# Patient Record
Sex: FEMALE | Race: WHITE | Employment: OTHER | URBAN - NONMETROPOLITAN AREA
[De-identification: names, ages, dates, MRNs, and addresses within clinical notes are randomized per-mention and may not be internally consistent; named-entity substitution may affect disease eponyms.]

---

## 2017-03-16 PROBLEM — I87.2 CHRONIC VENOUS INSUFFICIENCY: Status: ACTIVE | Noted: 2017-03-16

## 2017-03-16 PROBLEM — I73.9 PAD (PERIPHERAL ARTERY DISEASE) (HCC): Status: ACTIVE | Noted: 2017-03-16

## 2017-08-04 ENCOUNTER — HOSPITAL ENCOUNTER (EMERGENCY)
Age: 77
Discharge: HOME | End: 2017-08-04
Payer: COMMERCIAL

## 2017-08-04 VITALS
HEART RATE: 100 BPM | BODY MASS INDEX: 20.12 KG/M2 | TEMPERATURE: 98.2 F | WEIGHT: 106.6 LBS | RESPIRATION RATE: 18 BRPM | DIASTOLIC BLOOD PRESSURE: 64 MMHG | HEIGHT: 61 IN | SYSTOLIC BLOOD PRESSURE: 135 MMHG | OXYGEN SATURATION: 94 %

## 2017-08-04 DIAGNOSIS — L08.9 INFECTED SKIN TEAR: Primary | ICD-10-CM

## 2017-08-04 DIAGNOSIS — I87.2 CHRONIC VENOUS INSUFFICIENCY: ICD-10-CM

## 2017-08-04 DIAGNOSIS — T14.8XXA INFECTED SKIN TEAR: Primary | ICD-10-CM

## 2017-08-04 DIAGNOSIS — L03.116 CELLULITIS OF LEFT LOWER LEG: ICD-10-CM

## 2017-08-04 PROCEDURE — A6446 CONFORM BAND S W>=3" <5"/YD: HCPCS

## 2017-08-04 PROCEDURE — 6370000000 HC RX 637 (ALT 250 FOR IP)

## 2017-08-04 PROCEDURE — 90471 IMMUNIZATION ADMIN: CPT | Performed by: NURSE PRACTITIONER

## 2017-08-04 PROCEDURE — 90715 TDAP VACCINE 7 YRS/> IM: CPT | Performed by: NURSE PRACTITIONER

## 2017-08-04 PROCEDURE — 99213 OFFICE O/P EST LOW 20 MIN: CPT | Performed by: NURSE PRACTITIONER

## 2017-08-04 PROCEDURE — A6445 CONFORM BAND S W <3"/YD: HCPCS

## 2017-08-04 PROCEDURE — 6360000002 HC RX W HCPCS: Performed by: NURSE PRACTITIONER

## 2017-08-04 PROCEDURE — 99213 OFFICE O/P EST LOW 20 MIN: CPT

## 2017-08-04 RX ORDER — CEPHALEXIN 500 MG/1
500 CAPSULE ORAL 3 TIMES DAILY
Qty: 30 CAPSULE | Refills: 0 | Status: SHIPPED | OUTPATIENT
Start: 2017-08-04 | End: 2017-08-14

## 2017-08-04 RX ORDER — GINSENG 100 MG
CAPSULE ORAL ONCE
Status: COMPLETED | OUTPATIENT
Start: 2017-08-04 | End: 2017-08-04

## 2017-08-04 RX ORDER — DIAPER,BRIEF,INFANT-TODD,DISP
EACH MISCELLANEOUS
Status: COMPLETED
Start: 2017-08-04 | End: 2017-08-04

## 2017-08-04 RX ADMIN — BACITRACIN ZINC: 500 OINTMENT TOPICAL at 19:25

## 2017-08-04 RX ADMIN — TETANUS TOXOID, REDUCED DIPHTHERIA TOXOID AND ACELLULAR PERTUSSIS VACCINE, ADSORBED 0.5 ML: 5; 2.5; 8; 8; 2.5 SUSPENSION INTRAMUSCULAR at 19:15

## 2017-08-04 RX ADMIN — Medication: at 19:25

## 2017-08-04 ASSESSMENT — PAIN SCALES - GENERAL: PAINLEVEL_OUTOF10: 5

## 2017-08-04 ASSESSMENT — ENCOUNTER SYMPTOMS
VOMITING: 0
SHORTNESS OF BREATH: 1
NAUSEA: 0

## 2017-08-04 ASSESSMENT — PAIN DESCRIPTION - ORIENTATION: ORIENTATION: LEFT

## 2017-08-04 ASSESSMENT — PAIN DESCRIPTION - LOCATION: LOCATION: LEG

## 2019-02-11 ENCOUNTER — APPOINTMENT (OUTPATIENT)
Dept: CT IMAGING | Age: 79
DRG: 309 | End: 2019-02-11
Payer: MEDICARE

## 2019-02-11 ENCOUNTER — HOSPITAL ENCOUNTER (INPATIENT)
Age: 79
LOS: 4 days | Discharge: NURSING, REHAB OR LONG-TERM CARE | DRG: 309 | End: 2019-02-15
Attending: INTERNAL MEDICINE | Admitting: INTERNAL MEDICINE
Payer: MEDICARE

## 2019-02-11 ENCOUNTER — APPOINTMENT (OUTPATIENT)
Dept: INTERVENTIONAL RADIOLOGY/VASCULAR | Age: 79
DRG: 309 | End: 2019-02-11
Payer: MEDICARE

## 2019-02-11 ENCOUNTER — APPOINTMENT (OUTPATIENT)
Dept: GENERAL RADIOLOGY | Age: 79
DRG: 309 | End: 2019-02-11
Payer: MEDICARE

## 2019-02-11 DIAGNOSIS — Z47.1 AFTERCARE FOLLOWING LEFT HIP JOINT REPLACEMENT SURGERY: ICD-10-CM

## 2019-02-11 DIAGNOSIS — I48.91 ATRIAL FIBRILLATION WITH RVR (HCC): Primary | ICD-10-CM

## 2019-02-11 DIAGNOSIS — Z96.642 AFTERCARE FOLLOWING LEFT HIP JOINT REPLACEMENT SURGERY: ICD-10-CM

## 2019-02-11 LAB
ALBUMIN SERPL-MCNC: 3.1 G/DL (ref 3.5–5.1)
ALP BLD-CCNC: 84 U/L (ref 38–126)
ALT SERPL-CCNC: 12 U/L (ref 11–66)
ANION GAP SERPL CALCULATED.3IONS-SCNC: 9 MEQ/L (ref 8–16)
AST SERPL-CCNC: 32 U/L (ref 5–40)
BASOPHILS # BLD: 0.4 %
BASOPHILS ABSOLUTE: 0 THOU/MM3 (ref 0–0.1)
BILIRUB SERPL-MCNC: 0.7 MG/DL (ref 0.3–1.2)
BILIRUBIN DIRECT: < 0.2 MG/DL (ref 0–0.3)
BILIRUBIN URINE: NEGATIVE
BLOOD, URINE: NEGATIVE
BUN BLDV-MCNC: 21 MG/DL (ref 7–22)
CALCIUM SERPL-MCNC: 9.6 MG/DL (ref 8.5–10.5)
CHARACTER, URINE: CLEAR
CHLORIDE BLD-SCNC: 101 MEQ/L (ref 98–111)
CO2: 32 MEQ/L (ref 23–33)
COLOR: YELLOW
CREAT SERPL-MCNC: 0.8 MG/DL (ref 0.4–1.2)
D-DIMER QUANTITATIVE: ABNORMAL NG/ML FEU (ref 0–500)
EKG ATRIAL RATE: 288 BPM
EKG Q-T INTERVAL: 264 MS
EKG QRS DURATION: 84 MS
EKG QTC CALCULATION (BAZETT): 428 MS
EKG R AXIS: 50 DEGREES
EKG T AXIS: 32 DEGREES
EKG VENTRICULAR RATE: 158 BPM
EOSINOPHIL # BLD: 3.1 %
EOSINOPHILS ABSOLUTE: 0.3 THOU/MM3 (ref 0–0.4)
ERYTHROCYTE [DISTWIDTH] IN BLOOD BY AUTOMATED COUNT: 17.8 % (ref 11.5–14.5)
ERYTHROCYTE [DISTWIDTH] IN BLOOD BY AUTOMATED COUNT: 58.5 FL (ref 35–45)
GFR SERPL CREATININE-BSD FRML MDRD: 69 ML/MIN/1.73M2
GLUCOSE BLD-MCNC: 131 MG/DL (ref 70–108)
GLUCOSE URINE: NEGATIVE MG/DL
HCT VFR BLD CALC: 33.5 % (ref 37–47)
HEMOGLOBIN: 10.5 GM/DL (ref 12–16)
IMMATURE GRANS (ABS): 0.07 THOU/MM3 (ref 0–0.07)
IMMATURE GRANULOCYTES: 0.8 %
INR BLD: 1.05 (ref 0.85–1.13)
KETONES, URINE: NEGATIVE
LEUKOCYTE ESTERASE, URINE: NEGATIVE
LIPASE: 43.2 U/L (ref 5.6–51.3)
LYMPHOCYTES # BLD: 9.7 %
LYMPHOCYTES ABSOLUTE: 0.9 THOU/MM3 (ref 1–4.8)
MCH RBC QN AUTO: 29.8 PG (ref 26–33)
MCHC RBC AUTO-ENTMCNC: 31.3 GM/DL (ref 32.2–35.5)
MCV RBC AUTO: 95.2 FL (ref 81–99)
MONOCYTES # BLD: 9.3 %
MONOCYTES ABSOLUTE: 0.8 THOU/MM3 (ref 0.4–1.3)
NITRITE, URINE: NEGATIVE
NUCLEATED RED BLOOD CELLS: 0 /100 WBC
OSMOLALITY CALCULATION: 287.9 MOSMOL/KG (ref 275–300)
PH UA: 5.5
PLATELET # BLD: 360 THOU/MM3 (ref 130–400)
PMV BLD AUTO: 9.7 FL (ref 9.4–12.4)
POTASSIUM SERPL-SCNC: 5.5 MEQ/L (ref 3.5–5.2)
PRO-BNP: ABNORMAL PG/ML (ref 0–1800)
PROTEIN UA: NEGATIVE
RBC # BLD: 3.52 MILL/MM3 (ref 4.2–5.4)
SEG NEUTROPHILS: 76.7 %
SEGMENTED NEUTROPHILS ABSOLUTE COUNT: 6.9 THOU/MM3 (ref 1.8–7.7)
SODIUM BLD-SCNC: 142 MEQ/L (ref 135–145)
SPECIFIC GRAVITY, URINE: 1.02 (ref 1–1.03)
T4 FREE: 1.15 NG/DL (ref 0.93–1.76)
TOTAL PROTEIN: 5.9 G/DL (ref 6.1–8)
TROPONIN T: < 0.01 NG/ML
TSH SERPL DL<=0.05 MIU/L-ACNC: 5.47 UIU/ML (ref 0.4–4.2)
UROBILINOGEN, URINE: 0.2 EU/DL
WBC # BLD: 9 THOU/MM3 (ref 4.8–10.8)

## 2019-02-11 PROCEDURE — 2709999900 HC NON-CHARGEABLE SUPPLY

## 2019-02-11 PROCEDURE — 80076 HEPATIC FUNCTION PANEL: CPT

## 2019-02-11 PROCEDURE — 84443 ASSAY THYROID STIM HORMONE: CPT

## 2019-02-11 PROCEDURE — 6370000000 HC RX 637 (ALT 250 FOR IP): Performed by: INTERNAL MEDICINE

## 2019-02-11 PROCEDURE — 96365 THER/PROPH/DIAG IV INF INIT: CPT

## 2019-02-11 PROCEDURE — 6360000004 HC RX CONTRAST MEDICATION: Performed by: INTERNAL MEDICINE

## 2019-02-11 PROCEDURE — 96376 TX/PRO/DX INJ SAME DRUG ADON: CPT

## 2019-02-11 PROCEDURE — 71275 CT ANGIOGRAPHY CHEST: CPT

## 2019-02-11 PROCEDURE — 99285 EMERGENCY DEPT VISIT HI MDM: CPT

## 2019-02-11 PROCEDURE — 84484 ASSAY OF TROPONIN QUANT: CPT

## 2019-02-11 PROCEDURE — 85025 COMPLETE CBC W/AUTO DIFF WBC: CPT

## 2019-02-11 PROCEDURE — 2580000003 HC RX 258: Performed by: INTERNAL MEDICINE

## 2019-02-11 PROCEDURE — 85379 FIBRIN DEGRADATION QUANT: CPT

## 2019-02-11 PROCEDURE — 2140000000 HC CCU INTERMEDIATE R&B

## 2019-02-11 PROCEDURE — 71045 X-RAY EXAM CHEST 1 VIEW: CPT

## 2019-02-11 PROCEDURE — 93005 ELECTROCARDIOGRAM TRACING: CPT | Performed by: INTERNAL MEDICINE

## 2019-02-11 PROCEDURE — 2500000003 HC RX 250 WO HCPCS: Performed by: INTERNAL MEDICINE

## 2019-02-11 PROCEDURE — 96366 THER/PROPH/DIAG IV INF ADDON: CPT

## 2019-02-11 PROCEDURE — 36415 COLL VENOUS BLD VENIPUNCTURE: CPT

## 2019-02-11 PROCEDURE — 84439 ASSAY OF FREE THYROXINE: CPT

## 2019-02-11 PROCEDURE — 93971 EXTREMITY STUDY: CPT

## 2019-02-11 PROCEDURE — 83690 ASSAY OF LIPASE: CPT

## 2019-02-11 PROCEDURE — 96375 TX/PRO/DX INJ NEW DRUG ADDON: CPT

## 2019-02-11 PROCEDURE — 6360000002 HC RX W HCPCS: Performed by: INTERNAL MEDICINE

## 2019-02-11 PROCEDURE — 83880 ASSAY OF NATRIURETIC PEPTIDE: CPT

## 2019-02-11 PROCEDURE — 85610 PROTHROMBIN TIME: CPT

## 2019-02-11 PROCEDURE — 80048 BASIC METABOLIC PNL TOTAL CA: CPT

## 2019-02-11 PROCEDURE — 81003 URINALYSIS AUTO W/O SCOPE: CPT

## 2019-02-11 RX ORDER — DULOXETIN HYDROCHLORIDE 30 MG/1
30 CAPSULE, DELAYED RELEASE ORAL DAILY
Status: DISCONTINUED | OUTPATIENT
Start: 2019-02-12 | End: 2019-02-15 | Stop reason: HOSPADM

## 2019-02-11 RX ORDER — DOCUSATE SODIUM 100 MG/1
100 CAPSULE, LIQUID FILLED ORAL 2 TIMES DAILY
Status: DISCONTINUED | OUTPATIENT
Start: 2019-02-11 | End: 2019-02-15 | Stop reason: HOSPADM

## 2019-02-11 RX ORDER — ALBUTEROL SULFATE 2.5 MG/3ML
0.63 SOLUTION RESPIRATORY (INHALATION) 4 TIMES DAILY
Status: DISCONTINUED | OUTPATIENT
Start: 2019-02-11 | End: 2019-02-12

## 2019-02-11 RX ORDER — ONDANSETRON 2 MG/ML
4 INJECTION INTRAMUSCULAR; INTRAVENOUS ONCE
Status: COMPLETED | OUTPATIENT
Start: 2019-02-11 | End: 2019-02-11

## 2019-02-11 RX ORDER — DULOXETIN HYDROCHLORIDE 30 MG/1
30 CAPSULE, DELAYED RELEASE ORAL DAILY
COMMUNITY

## 2019-02-11 RX ORDER — CLONAZEPAM 1 MG/1
2 TABLET ORAL NIGHTLY PRN
Status: DISCONTINUED | OUTPATIENT
Start: 2019-02-11 | End: 2019-02-15 | Stop reason: HOSPADM

## 2019-02-11 RX ORDER — FENTANYL CITRATE 50 UG/ML
25 INJECTION, SOLUTION INTRAMUSCULAR; INTRAVENOUS ONCE
Status: COMPLETED | OUTPATIENT
Start: 2019-02-11 | End: 2019-02-11

## 2019-02-11 RX ORDER — DILTIAZEM HYDROCHLORIDE 5 MG/ML
10 INJECTION INTRAVENOUS ONCE
Status: COMPLETED | OUTPATIENT
Start: 2019-02-11 | End: 2019-02-11

## 2019-02-11 RX ORDER — ONDANSETRON 2 MG/ML
4 INJECTION INTRAMUSCULAR; INTRAVENOUS EVERY 6 HOURS PRN
Status: DISCONTINUED | OUTPATIENT
Start: 2019-02-11 | End: 2019-02-15 | Stop reason: HOSPADM

## 2019-02-11 RX ORDER — SODIUM CHLORIDE 0.9 % (FLUSH) 0.9 %
10 SYRINGE (ML) INJECTION PRN
Status: DISCONTINUED | OUTPATIENT
Start: 2019-02-11 | End: 2019-02-15 | Stop reason: HOSPADM

## 2019-02-11 RX ORDER — FENOFIBRATE 160 MG/1
160 TABLET ORAL DAILY
Status: DISCONTINUED | OUTPATIENT
Start: 2019-02-12 | End: 2019-02-15 | Stop reason: HOSPADM

## 2019-02-11 RX ORDER — DOCUSATE SODIUM 100 MG/1
100 CAPSULE, LIQUID FILLED ORAL 2 TIMES DAILY
COMMUNITY

## 2019-02-11 RX ORDER — FENOFIBRATE 54 MG/1
145 TABLET ORAL DAILY
Status: DISCONTINUED | OUTPATIENT
Start: 2019-02-11 | End: 2019-02-11 | Stop reason: CLARIF

## 2019-02-11 RX ORDER — ALBUTEROL SULFATE 0.63 MG/3ML
1 SOLUTION RESPIRATORY (INHALATION) 4 TIMES DAILY
COMMUNITY
End: 2019-05-13

## 2019-02-11 RX ORDER — 0.9 % SODIUM CHLORIDE 0.9 %
1000 INTRAVENOUS SOLUTION INTRAVENOUS ONCE
Status: COMPLETED | OUTPATIENT
Start: 2019-02-11 | End: 2019-02-11

## 2019-02-11 RX ORDER — PANTOPRAZOLE SODIUM 40 MG/1
40 TABLET, DELAYED RELEASE ORAL
Status: DISCONTINUED | OUTPATIENT
Start: 2019-02-12 | End: 2019-02-15 | Stop reason: HOSPADM

## 2019-02-11 RX ORDER — ALBUTEROL SULFATE 90 UG/1
2 AEROSOL, METERED RESPIRATORY (INHALATION) EVERY 6 HOURS PRN
Status: DISCONTINUED | OUTPATIENT
Start: 2019-02-11 | End: 2019-02-15 | Stop reason: HOSPADM

## 2019-02-11 RX ORDER — ANTACID TABLETS 500 MG/1
500 TABLET, CHEWABLE ORAL DAILY
COMMUNITY

## 2019-02-11 RX ORDER — LEVOTHYROXINE SODIUM 0.1 MG/1
100 TABLET ORAL DAILY
Status: DISCONTINUED | OUTPATIENT
Start: 2019-02-12 | End: 2019-02-13

## 2019-02-11 RX ORDER — SODIUM CHLORIDE 0.9 % (FLUSH) 0.9 %
10 SYRINGE (ML) INJECTION EVERY 12 HOURS SCHEDULED
Status: DISCONTINUED | OUTPATIENT
Start: 2019-02-11 | End: 2019-02-15 | Stop reason: HOSPADM

## 2019-02-11 RX ORDER — BUPRENORPHINE 5 UG/H
1 PATCH TRANSDERMAL WEEKLY
Status: ON HOLD | COMMUNITY
End: 2019-02-15

## 2019-02-11 RX ORDER — CALCIUM CARBONATE 200(500)MG
500 TABLET,CHEWABLE ORAL DAILY
Status: DISCONTINUED | OUTPATIENT
Start: 2019-02-12 | End: 2019-02-15 | Stop reason: HOSPADM

## 2019-02-11 RX ORDER — BUPRENORPHINE 5 UG/H
1 PATCH TRANSDERMAL WEEKLY
Status: DISCONTINUED | OUTPATIENT
Start: 2019-02-15 | End: 2019-02-15 | Stop reason: HOSPADM

## 2019-02-11 RX ORDER — POLYETHYLENE GLYCOL 3350 17 G/17G
17 POWDER, FOR SOLUTION ORAL DAILY
COMMUNITY

## 2019-02-11 RX ORDER — TRIAMCINOLONE ACETONIDE 1 MG/G
CREAM TOPICAL 3 TIMES DAILY
COMMUNITY

## 2019-02-11 RX ORDER — OXYCODONE HYDROCHLORIDE AND ACETAMINOPHEN 5; 325 MG/1; MG/1
1 TABLET ORAL EVERY 4 HOURS PRN
Status: ON HOLD | COMMUNITY
End: 2019-02-15

## 2019-02-11 RX ORDER — AMITRIPTYLINE HYDROCHLORIDE 25 MG/1
25 TABLET, FILM COATED ORAL NIGHTLY
Status: DISCONTINUED | OUTPATIENT
Start: 2019-02-11 | End: 2019-02-15 | Stop reason: HOSPADM

## 2019-02-11 RX ORDER — ROPINIROLE 0.25 MG/1
0.25 TABLET, FILM COATED ORAL NIGHTLY
Status: DISCONTINUED | OUTPATIENT
Start: 2019-02-11 | End: 2019-02-15 | Stop reason: HOSPADM

## 2019-02-11 RX ADMIN — IOPAMIDOL 80 ML: 755 INJECTION, SOLUTION INTRAVENOUS at 17:20

## 2019-02-11 RX ADMIN — DOCUSATE SODIUM 100 MG: 100 CAPSULE, LIQUID FILLED ORAL at 22:14

## 2019-02-11 RX ADMIN — ONDANSETRON 4 MG: 2 INJECTION INTRAMUSCULAR; INTRAVENOUS at 16:19

## 2019-02-11 RX ADMIN — SODIUM CHLORIDE 1000 ML: 9 INJECTION, SOLUTION INTRAVENOUS at 15:06

## 2019-02-11 RX ADMIN — ROPINIROLE HYDROCHLORIDE 0.25 MG: 0.25 TABLET, FILM COATED ORAL at 22:14

## 2019-02-11 RX ADMIN — CLONAZEPAM 2 MG: 1 TABLET ORAL at 22:14

## 2019-02-11 RX ADMIN — DILTIAZEM HYDROCHLORIDE 10 MG: 5 INJECTION INTRAVENOUS at 15:11

## 2019-02-11 RX ADMIN — APIXABAN 2.5 MG: 2.5 TABLET, FILM COATED ORAL at 22:14

## 2019-02-11 RX ADMIN — AMITRIPTYLINE HYDROCHLORIDE 25 MG: 25 TABLET, FILM COATED ORAL at 22:14

## 2019-02-11 RX ADMIN — DILTIAZEM HYDROCHLORIDE 5 MG/HR: 5 INJECTION INTRAVENOUS at 15:54

## 2019-02-11 RX ADMIN — METOPROLOL TARTRATE 12.5 MG: 25 TABLET ORAL at 22:15

## 2019-02-11 RX ADMIN — FENTANYL CITRATE 25 MCG: 50 INJECTION, SOLUTION INTRAMUSCULAR; INTRAVENOUS at 16:18

## 2019-02-11 RX ADMIN — Medication 10 ML: at 22:15

## 2019-02-11 ASSESSMENT — PAIN - FUNCTIONAL ASSESSMENT: PAIN_FUNCTIONAL_ASSESSMENT: PREVENTS OR INTERFERES WITH ALL ACTIVE AND SOME PASSIVE ACTIVITIES

## 2019-02-11 ASSESSMENT — PAIN DESCRIPTION - LOCATION: LOCATION: BACK;LEG

## 2019-02-11 ASSESSMENT — ENCOUNTER SYMPTOMS
EYE PAIN: 0
VOMITING: 0
EYE DISCHARGE: 0
COUGH: 0
RHINORRHEA: 0
DIARRHEA: 0
ABDOMINAL PAIN: 0
NAUSEA: 0
WHEEZING: 0
BACK PAIN: 0
SORE THROAT: 0
SHORTNESS OF BREATH: 0

## 2019-02-11 ASSESSMENT — PAIN SCALES - GENERAL
PAINLEVEL_OUTOF10: 10
PAINLEVEL_OUTOF10: 7

## 2019-02-11 ASSESSMENT — PAIN DESCRIPTION - ORIENTATION: ORIENTATION: LEFT

## 2019-02-11 ASSESSMENT — PAIN DESCRIPTION - FREQUENCY: FREQUENCY: CONTINUOUS

## 2019-02-11 ASSESSMENT — PAIN DESCRIPTION - DESCRIPTORS: DESCRIPTORS: CONSTANT;DISCOMFORT;ACHING

## 2019-02-11 ASSESSMENT — PAIN DESCRIPTION - PROGRESSION: CLINICAL_PROGRESSION: NOT CHANGED

## 2019-02-11 ASSESSMENT — PAIN DESCRIPTION - ONSET: ONSET: ON-GOING

## 2019-02-11 ASSESSMENT — PAIN DESCRIPTION - PAIN TYPE: TYPE: CHRONIC PAIN

## 2019-02-12 LAB
ANION GAP SERPL CALCULATED.3IONS-SCNC: 12 MEQ/L (ref 8–16)
BUN BLDV-MCNC: 18 MG/DL (ref 7–22)
CALCIUM SERPL-MCNC: 8.2 MG/DL (ref 8.5–10.5)
CHLORIDE BLD-SCNC: 101 MEQ/L (ref 98–111)
CO2: 25 MEQ/L (ref 23–33)
CREAT SERPL-MCNC: 0.7 MG/DL (ref 0.4–1.2)
GFR SERPL CREATININE-BSD FRML MDRD: 80 ML/MIN/1.73M2
GLUCOSE BLD-MCNC: 94 MG/DL (ref 70–108)
LV EF: 60 %
LVEF MODALITY: NORMAL
POTASSIUM REFLEX MAGNESIUM: 4.4 MEQ/L (ref 3.5–5.2)
SODIUM BLD-SCNC: 138 MEQ/L (ref 135–145)

## 2019-02-12 PROCEDURE — 2700000000 HC OXYGEN THERAPY PER DAY

## 2019-02-12 PROCEDURE — 2140000000 HC CCU INTERMEDIATE R&B

## 2019-02-12 PROCEDURE — 97166 OT EVAL MOD COMPLEX 45 MIN: CPT

## 2019-02-12 PROCEDURE — 6370000000 HC RX 637 (ALT 250 FOR IP): Performed by: INTERNAL MEDICINE

## 2019-02-12 PROCEDURE — 94760 N-INVAS EAR/PLS OXIMETRY 1: CPT

## 2019-02-12 PROCEDURE — 80048 BASIC METABOLIC PNL TOTAL CA: CPT

## 2019-02-12 PROCEDURE — 94640 AIRWAY INHALATION TREATMENT: CPT

## 2019-02-12 PROCEDURE — 6360000002 HC RX W HCPCS: Performed by: INTERNAL MEDICINE

## 2019-02-12 PROCEDURE — 93010 ELECTROCARDIOGRAM REPORT: CPT | Performed by: INTERNAL MEDICINE

## 2019-02-12 PROCEDURE — 97110 THERAPEUTIC EXERCISES: CPT

## 2019-02-12 PROCEDURE — 93306 TTE W/DOPPLER COMPLETE: CPT

## 2019-02-12 PROCEDURE — 36415 COLL VENOUS BLD VENIPUNCTURE: CPT

## 2019-02-12 PROCEDURE — 2709999900 HC NON-CHARGEABLE SUPPLY

## 2019-02-12 PROCEDURE — 99223 1ST HOSP IP/OBS HIGH 75: CPT | Performed by: INTERNAL MEDICINE

## 2019-02-12 PROCEDURE — 2580000003 HC RX 258: Performed by: INTERNAL MEDICINE

## 2019-02-12 PROCEDURE — 2500000003 HC RX 250 WO HCPCS: Performed by: INTERNAL MEDICINE

## 2019-02-12 RX ORDER — ACETAMINOPHEN 325 MG/1
650 TABLET ORAL EVERY 4 HOURS PRN
Status: DISCONTINUED | OUTPATIENT
Start: 2019-02-12 | End: 2019-02-15 | Stop reason: HOSPADM

## 2019-02-12 RX ORDER — ALBUTEROL SULFATE 2.5 MG/3ML
0.63 SOLUTION RESPIRATORY (INHALATION)
Status: DISCONTINUED | OUTPATIENT
Start: 2019-02-12 | End: 2019-02-15 | Stop reason: HOSPADM

## 2019-02-12 RX ORDER — METHYLPREDNISOLONE SODIUM SUCCINATE 40 MG/ML
40 INJECTION, POWDER, LYOPHILIZED, FOR SOLUTION INTRAMUSCULAR; INTRAVENOUS DAILY
Status: DISCONTINUED | OUTPATIENT
Start: 2019-02-12 | End: 2019-02-14

## 2019-02-12 RX ORDER — IPRATROPIUM BROMIDE AND ALBUTEROL SULFATE 2.5; .5 MG/3ML; MG/3ML
1 SOLUTION RESPIRATORY (INHALATION)
Status: DISCONTINUED | OUTPATIENT
Start: 2019-02-12 | End: 2019-02-15 | Stop reason: HOSPADM

## 2019-02-12 RX ORDER — MORPHINE SULFATE 2 MG/ML
2 INJECTION, SOLUTION INTRAMUSCULAR; INTRAVENOUS EVERY 4 HOURS PRN
Status: DISCONTINUED | OUTPATIENT
Start: 2019-02-12 | End: 2019-02-15 | Stop reason: HOSPADM

## 2019-02-12 RX ORDER — OXYCODONE HYDROCHLORIDE AND ACETAMINOPHEN 5; 325 MG/1; MG/1
1 TABLET ORAL EVERY 4 HOURS PRN
Status: DISCONTINUED | OUTPATIENT
Start: 2019-02-12 | End: 2019-02-15 | Stop reason: HOSPADM

## 2019-02-12 RX ADMIN — Medication 10 ML: at 20:47

## 2019-02-12 RX ADMIN — APIXABAN 2.5 MG: 2.5 TABLET, FILM COATED ORAL at 20:46

## 2019-02-12 RX ADMIN — DOCUSATE SODIUM 100 MG: 100 CAPSULE, LIQUID FILLED ORAL at 20:46

## 2019-02-12 RX ADMIN — ANTACID TABLETS 500 MG: 500 TABLET, CHEWABLE ORAL at 09:10

## 2019-02-12 RX ADMIN — ROPINIROLE HYDROCHLORIDE 0.25 MG: 0.25 TABLET, FILM COATED ORAL at 20:46

## 2019-02-12 RX ADMIN — DOCUSATE SODIUM 100 MG: 100 CAPSULE, LIQUID FILLED ORAL at 09:10

## 2019-02-12 RX ADMIN — FENOFIBRATE 160 MG: 160 TABLET ORAL at 09:10

## 2019-02-12 RX ADMIN — OXYCODONE AND ACETAMINOPHEN 1 TABLET: 5; 325 TABLET ORAL at 15:45

## 2019-02-12 RX ADMIN — ONDANSETRON 4 MG: 2 INJECTION INTRAMUSCULAR; INTRAVENOUS at 05:50

## 2019-02-12 RX ADMIN — METOPROLOL TARTRATE 12.5 MG: 25 TABLET ORAL at 09:10

## 2019-02-12 RX ADMIN — METOPROLOL TARTRATE 25 MG: 25 TABLET ORAL at 20:46

## 2019-02-12 RX ADMIN — IPRATROPIUM BROMIDE AND ALBUTEROL SULFATE 1 AMPULE: .5; 3 SOLUTION RESPIRATORY (INHALATION) at 15:57

## 2019-02-12 RX ADMIN — IPRATROPIUM BROMIDE AND ALBUTEROL SULFATE 1 AMPULE: .5; 3 SOLUTION RESPIRATORY (INHALATION) at 19:50

## 2019-02-12 RX ADMIN — LEVOTHYROXINE SODIUM 100 MCG: 100 TABLET ORAL at 05:50

## 2019-02-12 RX ADMIN — OXYCODONE AND ACETAMINOPHEN 1 TABLET: 5; 325 TABLET ORAL at 09:17

## 2019-02-12 RX ADMIN — PANTOPRAZOLE SODIUM 40 MG: 40 TABLET, DELAYED RELEASE ORAL at 15:47

## 2019-02-12 RX ADMIN — PANTOPRAZOLE SODIUM 40 MG: 40 TABLET, DELAYED RELEASE ORAL at 05:50

## 2019-02-12 RX ADMIN — APIXABAN 2.5 MG: 2.5 TABLET, FILM COATED ORAL at 09:10

## 2019-02-12 RX ADMIN — ALBUTEROL SULFATE 0.63 MG: 2.5 SOLUTION RESPIRATORY (INHALATION) at 08:21

## 2019-02-12 RX ADMIN — OXYCODONE AND ACETAMINOPHEN 1 TABLET: 5; 325 TABLET ORAL at 20:49

## 2019-02-12 RX ADMIN — DULOXETINE HYDROCHLORIDE 30 MG: 30 CAPSULE, DELAYED RELEASE ORAL at 09:10

## 2019-02-12 RX ADMIN — DILTIAZEM HYDROCHLORIDE 5 MG/HR: 5 INJECTION INTRAVENOUS at 05:49

## 2019-02-12 RX ADMIN — IPRATROPIUM BROMIDE AND ALBUTEROL SULFATE 1 AMPULE: .5; 3 SOLUTION RESPIRATORY (INHALATION) at 11:46

## 2019-02-12 RX ADMIN — METHYLPREDNISOLONE SODIUM SUCCINATE 40 MG: 40 INJECTION, POWDER, FOR SOLUTION INTRAMUSCULAR; INTRAVENOUS at 15:47

## 2019-02-12 RX ADMIN — AMITRIPTYLINE HYDROCHLORIDE 25 MG: 25 TABLET, FILM COATED ORAL at 20:46

## 2019-02-12 ASSESSMENT — PAIN DESCRIPTION - PAIN TYPE: TYPE: CHRONIC PAIN

## 2019-02-12 ASSESSMENT — PAIN SCALES - GENERAL
PAINLEVEL_OUTOF10: 9
PAINLEVEL_OUTOF10: 8
PAINLEVEL_OUTOF10: 5
PAINLEVEL_OUTOF10: 0
PAINLEVEL_OUTOF10: 9

## 2019-02-12 ASSESSMENT — PAIN DESCRIPTION - LOCATION: LOCATION: BACK

## 2019-02-13 PROCEDURE — 2580000003 HC RX 258: Performed by: INTERNAL MEDICINE

## 2019-02-13 PROCEDURE — 97110 THERAPEUTIC EXERCISES: CPT

## 2019-02-13 PROCEDURE — 2500000003 HC RX 250 WO HCPCS: Performed by: INTERNAL MEDICINE

## 2019-02-13 PROCEDURE — 97162 PT EVAL MOD COMPLEX 30 MIN: CPT

## 2019-02-13 PROCEDURE — 2700000000 HC OXYGEN THERAPY PER DAY

## 2019-02-13 PROCEDURE — 6370000000 HC RX 637 (ALT 250 FOR IP): Performed by: INTERNAL MEDICINE

## 2019-02-13 PROCEDURE — 6360000002 HC RX W HCPCS: Performed by: INTERNAL MEDICINE

## 2019-02-13 PROCEDURE — 99232 SBSQ HOSP IP/OBS MODERATE 35: CPT | Performed by: NURSE PRACTITIONER

## 2019-02-13 PROCEDURE — 97530 THERAPEUTIC ACTIVITIES: CPT

## 2019-02-13 PROCEDURE — 94640 AIRWAY INHALATION TREATMENT: CPT

## 2019-02-13 PROCEDURE — 6370000000 HC RX 637 (ALT 250 FOR IP): Performed by: NURSE PRACTITIONER

## 2019-02-13 PROCEDURE — 2140000000 HC CCU INTERMEDIATE R&B

## 2019-02-13 PROCEDURE — 6360000002 HC RX W HCPCS: Performed by: NURSE PRACTITIONER

## 2019-02-13 PROCEDURE — 2709999900 HC NON-CHARGEABLE SUPPLY

## 2019-02-13 PROCEDURE — 94761 N-INVAS EAR/PLS OXIMETRY MLT: CPT

## 2019-02-13 RX ORDER — LEVOTHYROXINE SODIUM 112 UG/1
112 TABLET ORAL DAILY
Status: DISCONTINUED | OUTPATIENT
Start: 2019-02-14 | End: 2019-02-15 | Stop reason: HOSPADM

## 2019-02-13 RX ORDER — FUROSEMIDE 10 MG/ML
20 INJECTION INTRAMUSCULAR; INTRAVENOUS ONCE
Status: COMPLETED | OUTPATIENT
Start: 2019-02-13 | End: 2019-02-13

## 2019-02-13 RX ADMIN — PANTOPRAZOLE SODIUM 40 MG: 40 TABLET, DELAYED RELEASE ORAL at 17:35

## 2019-02-13 RX ADMIN — METOPROLOL TARTRATE 25 MG: 25 TABLET ORAL at 21:48

## 2019-02-13 RX ADMIN — DOCUSATE SODIUM 100 MG: 100 CAPSULE, LIQUID FILLED ORAL at 20:32

## 2019-02-13 RX ADMIN — OXYCODONE AND ACETAMINOPHEN 1 TABLET: 5; 325 TABLET ORAL at 21:48

## 2019-02-13 RX ADMIN — METOPROLOL TARTRATE 25 MG: 25 TABLET ORAL at 09:47

## 2019-02-13 RX ADMIN — FUROSEMIDE 20 MG: 10 INJECTION, SOLUTION INTRAMUSCULAR; INTRAVENOUS at 13:38

## 2019-02-13 RX ADMIN — APIXABAN 2.5 MG: 2.5 TABLET, FILM COATED ORAL at 09:46

## 2019-02-13 RX ADMIN — IPRATROPIUM BROMIDE AND ALBUTEROL SULFATE 1 AMPULE: .5; 3 SOLUTION RESPIRATORY (INHALATION) at 20:18

## 2019-02-13 RX ADMIN — CLONAZEPAM 2 MG: 1 TABLET ORAL at 20:32

## 2019-02-13 RX ADMIN — IPRATROPIUM BROMIDE AND ALBUTEROL SULFATE 1 AMPULE: .5; 3 SOLUTION RESPIRATORY (INHALATION) at 08:59

## 2019-02-13 RX ADMIN — APIXABAN 5 MG: 5 TABLET, FILM COATED ORAL at 20:33

## 2019-02-13 RX ADMIN — OXYCODONE AND ACETAMINOPHEN 1 TABLET: 5; 325 TABLET ORAL at 12:29

## 2019-02-13 RX ADMIN — FENOFIBRATE 160 MG: 160 TABLET ORAL at 09:46

## 2019-02-13 RX ADMIN — LEVOTHYROXINE SODIUM 100 MCG: 100 TABLET ORAL at 05:57

## 2019-02-13 RX ADMIN — METOPROLOL TARTRATE 25 MG: 25 TABLET ORAL at 17:35

## 2019-02-13 RX ADMIN — DILTIAZEM HYDROCHLORIDE 5 MG/ML: 5 INJECTION INTRAVENOUS at 11:12

## 2019-02-13 RX ADMIN — OXYCODONE AND ACETAMINOPHEN 1 TABLET: 5; 325 TABLET ORAL at 17:35

## 2019-02-13 RX ADMIN — IPRATROPIUM BROMIDE AND ALBUTEROL SULFATE 1 AMPULE: .5; 3 SOLUTION RESPIRATORY (INHALATION) at 12:39

## 2019-02-13 RX ADMIN — AMITRIPTYLINE HYDROCHLORIDE 25 MG: 25 TABLET, FILM COATED ORAL at 20:32

## 2019-02-13 RX ADMIN — OXYCODONE AND ACETAMINOPHEN 1 TABLET: 5; 325 TABLET ORAL at 08:13

## 2019-02-13 RX ADMIN — Medication 10 ML: at 20:32

## 2019-02-13 RX ADMIN — ANTACID TABLETS 500 MG: 500 TABLET, CHEWABLE ORAL at 09:47

## 2019-02-13 RX ADMIN — DULOXETINE HYDROCHLORIDE 30 MG: 30 CAPSULE, DELAYED RELEASE ORAL at 09:47

## 2019-02-13 RX ADMIN — ROPINIROLE HYDROCHLORIDE 0.25 MG: 0.25 TABLET, FILM COATED ORAL at 20:32

## 2019-02-13 RX ADMIN — METHYLPREDNISOLONE SODIUM SUCCINATE 40 MG: 40 INJECTION, POWDER, FOR SOLUTION INTRAMUSCULAR; INTRAVENOUS at 10:01

## 2019-02-13 RX ADMIN — DOCUSATE SODIUM 100 MG: 100 CAPSULE, LIQUID FILLED ORAL at 09:46

## 2019-02-13 RX ADMIN — IPRATROPIUM BROMIDE AND ALBUTEROL SULFATE 1 AMPULE: .5; 3 SOLUTION RESPIRATORY (INHALATION) at 16:23

## 2019-02-13 RX ADMIN — PANTOPRAZOLE SODIUM 40 MG: 40 TABLET, DELAYED RELEASE ORAL at 05:57

## 2019-02-13 ASSESSMENT — PAIN DESCRIPTION - PROGRESSION
CLINICAL_PROGRESSION: GRADUALLY WORSENING
CLINICAL_PROGRESSION: GRADUALLY IMPROVING
CLINICAL_PROGRESSION: GRADUALLY WORSENING
CLINICAL_PROGRESSION: GRADUALLY WORSENING

## 2019-02-13 ASSESSMENT — PAIN DESCRIPTION - DESCRIPTORS
DESCRIPTORS_2: ACHING;CONSTANT
DESCRIPTORS: ACHING
DESCRIPTORS_2: ACHING
DESCRIPTORS: ACHING
DESCRIPTORS: ACHING

## 2019-02-13 ASSESSMENT — PAIN DESCRIPTION - FREQUENCY
FREQUENCY: INTERMITTENT

## 2019-02-13 ASSESSMENT — PAIN SCALES - GENERAL
PAINLEVEL_OUTOF10: 9
PAINLEVEL_OUTOF10: 8
PAINLEVEL_OUTOF10: 5
PAINLEVEL_OUTOF10: 8
PAINLEVEL_OUTOF10: 5
PAINLEVEL_OUTOF10: 5
PAINLEVEL_OUTOF10: 8
PAINLEVEL_OUTOF10: 9
PAINLEVEL_OUTOF10: 9
PAINLEVEL_OUTOF10: 5
PAINLEVEL_OUTOF10: 8
PAINLEVEL_OUTOF10: 6

## 2019-02-13 ASSESSMENT — PAIN DESCRIPTION - INTENSITY
RATING_2: 3
RATING_2: 2

## 2019-02-13 ASSESSMENT — PAIN DESCRIPTION - PAIN TYPE
TYPE: SURGICAL PAIN
TYPE_2: SUPERFICIAL SOMATIC
TYPE: SURGICAL PAIN

## 2019-02-13 ASSESSMENT — PAIN DESCRIPTION - ORIENTATION
ORIENTATION: LEFT
ORIENTATION: LEFT
ORIENTATION_2: LEFT
ORIENTATION: LEFT
ORIENTATION_2: LEFT
ORIENTATION: LEFT
ORIENTATION: LEFT

## 2019-02-13 ASSESSMENT — PAIN - FUNCTIONAL ASSESSMENT
PAIN_FUNCTIONAL_ASSESSMENT: ACTIVITIES ARE NOT PREVENTED
PAIN_FUNCTIONAL_ASSESSMENT: PREVENTS OR INTERFERES SOME ACTIVE ACTIVITIES AND ADLS
PAIN_FUNCTIONAL_ASSESSMENT: PREVENTS OR INTERFERES WITH MANY ACTIVE NOT PASSIVE ACTIVITIES
PAIN_FUNCTIONAL_ASSESSMENT: PREVENTS OR INTERFERES SOME ACTIVE ACTIVITIES AND ADLS
PAIN_FUNCTIONAL_ASSESSMENT: PREVENTS OR INTERFERES SOME ACTIVE ACTIVITIES AND ADLS

## 2019-02-13 ASSESSMENT — PAIN DESCRIPTION - ONSET
ONSET: GRADUAL
ONSET_2: ON-GOING
ONSET_2: ON-GOING
ONSET: PROGRESSIVE

## 2019-02-13 ASSESSMENT — PAIN DESCRIPTION - LOCATION
LOCATION: HIP
LOCATION_2: ARM
LOCATION: LEG
LOCATION: HIP
LOCATION: HIP;LEG
LOCATION: HIP
LOCATION_2: ARM

## 2019-02-13 ASSESSMENT — PAIN DESCRIPTION - DURATION
DURATION_2: CONTINUOUS
DURATION_2: INTERMITTENT

## 2019-02-14 LAB
ANION GAP SERPL CALCULATED.3IONS-SCNC: 10 MEQ/L (ref 8–16)
BUN BLDV-MCNC: 33 MG/DL (ref 7–22)
CALCIUM SERPL-MCNC: 9.2 MG/DL (ref 8.5–10.5)
CHLORIDE BLD-SCNC: 98 MEQ/L (ref 98–111)
CO2: 27 MEQ/L (ref 23–33)
CREAT SERPL-MCNC: 0.9 MG/DL (ref 0.4–1.2)
ERYTHROCYTE [DISTWIDTH] IN BLOOD BY AUTOMATED COUNT: 18.2 % (ref 11.5–14.5)
ERYTHROCYTE [DISTWIDTH] IN BLOOD BY AUTOMATED COUNT: 63.5 FL (ref 35–45)
GFR SERPL CREATININE-BSD FRML MDRD: 60 ML/MIN/1.73M2
GLUCOSE BLD-MCNC: 134 MG/DL (ref 70–108)
HCT VFR BLD CALC: 35.1 % (ref 37–47)
HEMOGLOBIN: 10.7 GM/DL (ref 12–16)
MCH RBC QN AUTO: 29.6 PG (ref 26–33)
MCHC RBC AUTO-ENTMCNC: 30.5 GM/DL (ref 32.2–35.5)
MCV RBC AUTO: 97 FL (ref 81–99)
PLATELET # BLD: 470 THOU/MM3 (ref 130–400)
PMV BLD AUTO: 10.1 FL (ref 9.4–12.4)
POTASSIUM SERPL-SCNC: 6.1 MEQ/L (ref 3.5–5.2)
RBC # BLD: 3.62 MILL/MM3 (ref 4.2–5.4)
SODIUM BLD-SCNC: 135 MEQ/L (ref 135–145)
WBC # BLD: 12.1 THOU/MM3 (ref 4.8–10.8)

## 2019-02-14 PROCEDURE — 80048 BASIC METABOLIC PNL TOTAL CA: CPT

## 2019-02-14 PROCEDURE — 6370000000 HC RX 637 (ALT 250 FOR IP): Performed by: INTERNAL MEDICINE

## 2019-02-14 PROCEDURE — 94640 AIRWAY INHALATION TREATMENT: CPT

## 2019-02-14 PROCEDURE — 2580000003 HC RX 258: Performed by: INTERNAL MEDICINE

## 2019-02-14 PROCEDURE — 94761 N-INVAS EAR/PLS OXIMETRY MLT: CPT

## 2019-02-14 PROCEDURE — 2700000000 HC OXYGEN THERAPY PER DAY

## 2019-02-14 PROCEDURE — 85027 COMPLETE CBC AUTOMATED: CPT

## 2019-02-14 PROCEDURE — 36415 COLL VENOUS BLD VENIPUNCTURE: CPT

## 2019-02-14 PROCEDURE — 97110 THERAPEUTIC EXERCISES: CPT

## 2019-02-14 PROCEDURE — 2140000000 HC CCU INTERMEDIATE R&B

## 2019-02-14 PROCEDURE — 6360000002 HC RX W HCPCS: Performed by: INTERNAL MEDICINE

## 2019-02-14 PROCEDURE — 97116 GAIT TRAINING THERAPY: CPT

## 2019-02-14 PROCEDURE — 6370000000 HC RX 637 (ALT 250 FOR IP): Performed by: NURSE PRACTITIONER

## 2019-02-14 PROCEDURE — 99232 SBSQ HOSP IP/OBS MODERATE 35: CPT | Performed by: NURSE PRACTITIONER

## 2019-02-14 PROCEDURE — 2709999900 HC NON-CHARGEABLE SUPPLY

## 2019-02-14 PROCEDURE — 6360000002 HC RX W HCPCS: Performed by: NURSE PRACTITIONER

## 2019-02-14 RX ORDER — SENNA PLUS 8.6 MG/1
2 TABLET ORAL 2 TIMES DAILY
Status: DISCONTINUED | OUTPATIENT
Start: 2019-02-14 | End: 2019-02-15 | Stop reason: HOSPADM

## 2019-02-14 RX ORDER — POLYETHYLENE GLYCOL 3350 17 G/17G
17 POWDER, FOR SOLUTION ORAL DAILY
Status: DISCONTINUED | OUTPATIENT
Start: 2019-02-14 | End: 2019-02-15 | Stop reason: HOSPADM

## 2019-02-14 RX ORDER — DILTIAZEM HYDROCHLORIDE 120 MG/1
120 CAPSULE, COATED, EXTENDED RELEASE ORAL DAILY
Status: DISCONTINUED | OUTPATIENT
Start: 2019-02-14 | End: 2019-02-15 | Stop reason: HOSPADM

## 2019-02-14 RX ORDER — DIGOXIN 0.25 MG/ML
500 INJECTION INTRAMUSCULAR; INTRAVENOUS ONCE
Status: COMPLETED | OUTPATIENT
Start: 2019-02-14 | End: 2019-02-14

## 2019-02-14 RX ORDER — DIGOXIN 0.25 MG/ML
250 INJECTION INTRAMUSCULAR; INTRAVENOUS EVERY 6 HOURS
Status: COMPLETED | OUTPATIENT
Start: 2019-02-14 | End: 2019-02-15

## 2019-02-14 RX ORDER — PREDNISONE 20 MG/1
40 TABLET ORAL DAILY
Status: DISCONTINUED | OUTPATIENT
Start: 2019-02-15 | End: 2019-02-15 | Stop reason: HOSPADM

## 2019-02-14 RX ADMIN — METOPROLOL TARTRATE 25 MG: 25 TABLET ORAL at 08:01

## 2019-02-14 RX ADMIN — DOCUSATE SODIUM 100 MG: 100 CAPSULE, LIQUID FILLED ORAL at 21:27

## 2019-02-14 RX ADMIN — IPRATROPIUM BROMIDE AND ALBUTEROL SULFATE 1 AMPULE: .5; 3 SOLUTION RESPIRATORY (INHALATION) at 15:35

## 2019-02-14 RX ADMIN — Medication 10 ML: at 08:07

## 2019-02-14 RX ADMIN — SENNOSIDES 17.2 MG: 8.6 TABLET, FILM COATED ORAL at 21:27

## 2019-02-14 RX ADMIN — IPRATROPIUM BROMIDE AND ALBUTEROL SULFATE 1 AMPULE: .5; 3 SOLUTION RESPIRATORY (INHALATION) at 07:44

## 2019-02-14 RX ADMIN — ROPINIROLE HYDROCHLORIDE 0.25 MG: 0.25 TABLET, FILM COATED ORAL at 21:26

## 2019-02-14 RX ADMIN — METHYLPREDNISOLONE SODIUM SUCCINATE 40 MG: 40 INJECTION, POWDER, FOR SOLUTION INTRAMUSCULAR; INTRAVENOUS at 08:02

## 2019-02-14 RX ADMIN — DULOXETINE HYDROCHLORIDE 30 MG: 30 CAPSULE, DELAYED RELEASE ORAL at 08:01

## 2019-02-14 RX ADMIN — DIGOXIN 500 MCG: 0.25 INJECTION INTRAMUSCULAR; INTRAVENOUS at 13:23

## 2019-02-14 RX ADMIN — AMITRIPTYLINE HYDROCHLORIDE 25 MG: 25 TABLET, FILM COATED ORAL at 21:26

## 2019-02-14 RX ADMIN — METOPROLOL TARTRATE 25 MG: 25 TABLET ORAL at 04:21

## 2019-02-14 RX ADMIN — ANTACID TABLETS 500 MG: 500 TABLET, CHEWABLE ORAL at 08:01

## 2019-02-14 RX ADMIN — PANTOPRAZOLE SODIUM 40 MG: 40 TABLET, DELAYED RELEASE ORAL at 18:25

## 2019-02-14 RX ADMIN — Medication 10 ML: at 13:24

## 2019-02-14 RX ADMIN — LEVOTHYROXINE SODIUM 112 MCG: 0.11 TABLET ORAL at 04:21

## 2019-02-14 RX ADMIN — OXYCODONE AND ACETAMINOPHEN 1 TABLET: 5; 325 TABLET ORAL at 21:26

## 2019-02-14 RX ADMIN — METOPROLOL TARTRATE 25 MG: 25 TABLET ORAL at 21:26

## 2019-02-14 RX ADMIN — OXYCODONE AND ACETAMINOPHEN 1 TABLET: 5; 325 TABLET ORAL at 12:58

## 2019-02-14 RX ADMIN — METOPROLOL TARTRATE 25 MG: 25 TABLET ORAL at 18:30

## 2019-02-14 RX ADMIN — OXYCODONE AND ACETAMINOPHEN 1 TABLET: 5; 325 TABLET ORAL at 08:01

## 2019-02-14 RX ADMIN — APIXABAN 5 MG: 5 TABLET, FILM COATED ORAL at 08:01

## 2019-02-14 RX ADMIN — FENOFIBRATE 160 MG: 160 TABLET ORAL at 08:01

## 2019-02-14 RX ADMIN — DILTIAZEM HYDROCHLORIDE 120 MG: 120 CAPSULE, EXTENDED RELEASE ORAL at 12:03

## 2019-02-14 RX ADMIN — IPRATROPIUM BROMIDE AND ALBUTEROL SULFATE 1 AMPULE: .5; 3 SOLUTION RESPIRATORY (INHALATION) at 19:59

## 2019-02-14 RX ADMIN — POLYETHYLENE GLYCOL 3350 17 G: 17 POWDER, FOR SOLUTION ORAL at 18:22

## 2019-02-14 RX ADMIN — APIXABAN 5 MG: 5 TABLET, FILM COATED ORAL at 21:27

## 2019-02-14 RX ADMIN — PANTOPRAZOLE SODIUM 40 MG: 40 TABLET, DELAYED RELEASE ORAL at 08:02

## 2019-02-14 RX ADMIN — CLONAZEPAM 2 MG: 1 TABLET ORAL at 21:26

## 2019-02-14 RX ADMIN — DOCUSATE SODIUM 100 MG: 100 CAPSULE, LIQUID FILLED ORAL at 08:02

## 2019-02-14 RX ADMIN — Medication 10 ML: at 21:27

## 2019-02-14 RX ADMIN — DIGOXIN 250 MCG: 0.25 INJECTION INTRAMUSCULAR; INTRAVENOUS at 18:24

## 2019-02-14 ASSESSMENT — PAIN SCALES - GENERAL
PAINLEVEL_OUTOF10: 8
PAINLEVEL_OUTOF10: 9
PAINLEVEL_OUTOF10: 6
PAINLEVEL_OUTOF10: 6
PAINLEVEL_OUTOF10: 3
PAINLEVEL_OUTOF10: 6

## 2019-02-14 ASSESSMENT — PAIN DESCRIPTION - FREQUENCY
FREQUENCY: INTERMITTENT

## 2019-02-14 ASSESSMENT — PAIN DESCRIPTION - PROGRESSION
CLINICAL_PROGRESSION: GRADUALLY IMPROVING
CLINICAL_PROGRESSION: GRADUALLY WORSENING
CLINICAL_PROGRESSION: GRADUALLY WORSENING

## 2019-02-14 ASSESSMENT — PAIN DESCRIPTION - ORIENTATION
ORIENTATION: LEFT

## 2019-02-14 ASSESSMENT — PAIN DESCRIPTION - ONSET
ONSET: ON-GOING
ONSET: ON-GOING

## 2019-02-14 ASSESSMENT — PAIN - FUNCTIONAL ASSESSMENT
PAIN_FUNCTIONAL_ASSESSMENT: PREVENTS OR INTERFERES SOME ACTIVE ACTIVITIES AND ADLS

## 2019-02-14 ASSESSMENT — PAIN DESCRIPTION - DESCRIPTORS
DESCRIPTORS: ACHING
DESCRIPTORS: ACHING
DESCRIPTORS: BURNING

## 2019-02-14 ASSESSMENT — PAIN DESCRIPTION - LOCATION
LOCATION: HIP
LOCATION: LEG
LOCATION: HIP
LOCATION: HIP
LOCATION: HIP;LEG

## 2019-02-14 ASSESSMENT — PAIN DESCRIPTION - PAIN TYPE
TYPE: SURGICAL PAIN

## 2019-02-15 VITALS
DIASTOLIC BLOOD PRESSURE: 66 MMHG | TEMPERATURE: 98 F | SYSTOLIC BLOOD PRESSURE: 123 MMHG | HEIGHT: 63 IN | WEIGHT: 142.7 LBS | RESPIRATION RATE: 18 BRPM | OXYGEN SATURATION: 94 % | HEART RATE: 58 BPM | BODY MASS INDEX: 25.29 KG/M2

## 2019-02-15 PROBLEM — E03.9 ACQUIRED HYPOTHYROIDISM: Status: ACTIVE | Noted: 2019-02-15

## 2019-02-15 PROBLEM — I10 ESSENTIAL HYPERTENSION: Status: ACTIVE | Noted: 2019-02-15

## 2019-02-15 LAB — POTASSIUM SERPL-SCNC: 4.3 MEQ/L (ref 3.5–5.2)

## 2019-02-15 PROCEDURE — 94640 AIRWAY INHALATION TREATMENT: CPT

## 2019-02-15 PROCEDURE — 6360000002 HC RX W HCPCS: Performed by: NURSE PRACTITIONER

## 2019-02-15 PROCEDURE — 94760 N-INVAS EAR/PLS OXIMETRY 1: CPT

## 2019-02-15 PROCEDURE — 6370000000 HC RX 637 (ALT 250 FOR IP): Performed by: NURSE PRACTITIONER

## 2019-02-15 PROCEDURE — 97535 SELF CARE MNGMENT TRAINING: CPT

## 2019-02-15 PROCEDURE — 36415 COLL VENOUS BLD VENIPUNCTURE: CPT

## 2019-02-15 PROCEDURE — 97530 THERAPEUTIC ACTIVITIES: CPT

## 2019-02-15 PROCEDURE — 6370000000 HC RX 637 (ALT 250 FOR IP): Performed by: INTERNAL MEDICINE

## 2019-02-15 PROCEDURE — 84132 ASSAY OF SERUM POTASSIUM: CPT

## 2019-02-15 PROCEDURE — 2709999900 HC NON-CHARGEABLE SUPPLY

## 2019-02-15 PROCEDURE — 2700000000 HC OXYGEN THERAPY PER DAY

## 2019-02-15 PROCEDURE — 2580000003 HC RX 258: Performed by: INTERNAL MEDICINE

## 2019-02-15 PROCEDURE — 99232 SBSQ HOSP IP/OBS MODERATE 35: CPT | Performed by: NURSE PRACTITIONER

## 2019-02-15 RX ORDER — PREDNISONE 20 MG/1
40 TABLET ORAL DAILY
Qty: 20 TABLET | Refills: 0 | DISCHARGE
Start: 2019-02-16 | End: 2019-02-21

## 2019-02-15 RX ORDER — SENNA PLUS 8.6 MG/1
2 TABLET ORAL 2 TIMES DAILY
Qty: 120 TABLET | Refills: 0 | DISCHARGE
Start: 2019-02-15 | End: 2019-03-17

## 2019-02-15 RX ORDER — METOPROLOL TARTRATE 50 MG/1
50 TABLET, FILM COATED ORAL 2 TIMES DAILY
Qty: 60 TABLET | Refills: 0
Start: 2019-02-16

## 2019-02-15 RX ORDER — BUPRENORPHINE 5 UG/H
1 PATCH TRANSDERMAL WEEKLY
Qty: 1 PATCH | Refills: 0 | Status: SHIPPED | OUTPATIENT
Start: 2019-02-15 | End: 2019-02-22

## 2019-02-15 RX ORDER — DILTIAZEM HYDROCHLORIDE 120 MG/1
120 CAPSULE, COATED, EXTENDED RELEASE ORAL DAILY
Qty: 30 CAPSULE | Refills: 0
Start: 2019-02-15

## 2019-02-15 RX ORDER — OXYCODONE HYDROCHLORIDE AND ACETAMINOPHEN 5; 325 MG/1; MG/1
1 TABLET ORAL EVERY 8 HOURS PRN
Qty: 10 TABLET | Refills: 0 | Status: SHIPPED | OUTPATIENT
Start: 2019-02-15 | End: 2019-02-19

## 2019-02-15 RX ORDER — METOPROLOL TARTRATE 50 MG/1
50 TABLET, FILM COATED ORAL 2 TIMES DAILY
Status: DISCONTINUED | OUTPATIENT
Start: 2019-02-16 | End: 2019-02-15 | Stop reason: HOSPADM

## 2019-02-15 RX ORDER — LEVOTHYROXINE SODIUM 112 UG/1
112 TABLET ORAL DAILY
Qty: 30 TABLET | Refills: 3 | DISCHARGE
Start: 2019-02-16

## 2019-02-15 RX ADMIN — DULOXETINE HYDROCHLORIDE 30 MG: 30 CAPSULE, DELAYED RELEASE ORAL at 09:59

## 2019-02-15 RX ADMIN — PREDNISONE 40 MG: 20 TABLET ORAL at 09:58

## 2019-02-15 RX ADMIN — APIXABAN 5 MG: 5 TABLET, FILM COATED ORAL at 09:58

## 2019-02-15 RX ADMIN — SENNOSIDES 17.2 MG: 8.6 TABLET, FILM COATED ORAL at 09:58

## 2019-02-15 RX ADMIN — PANTOPRAZOLE SODIUM 40 MG: 40 TABLET, DELAYED RELEASE ORAL at 06:42

## 2019-02-15 RX ADMIN — ANTACID TABLETS 500 MG: 500 TABLET, CHEWABLE ORAL at 09:59

## 2019-02-15 RX ADMIN — OXYCODONE AND ACETAMINOPHEN 1 TABLET: 5; 325 TABLET ORAL at 14:35

## 2019-02-15 RX ADMIN — DOCUSATE SODIUM 100 MG: 100 CAPSULE, LIQUID FILLED ORAL at 09:59

## 2019-02-15 RX ADMIN — Medication 10 ML: at 00:30

## 2019-02-15 RX ADMIN — Medication 10 ML: at 09:59

## 2019-02-15 RX ADMIN — DILTIAZEM HYDROCHLORIDE 120 MG: 120 CAPSULE, EXTENDED RELEASE ORAL at 09:59

## 2019-02-15 RX ADMIN — POLYETHYLENE GLYCOL 3350 17 G: 17 POWDER, FOR SOLUTION ORAL at 14:00

## 2019-02-15 RX ADMIN — METOPROLOL TARTRATE 25 MG: 25 TABLET ORAL at 09:58

## 2019-02-15 RX ADMIN — IPRATROPIUM BROMIDE AND ALBUTEROL SULFATE 1 AMPULE: .5; 3 SOLUTION RESPIRATORY (INHALATION) at 08:58

## 2019-02-15 RX ADMIN — LEVOTHYROXINE SODIUM 112 MCG: 0.11 TABLET ORAL at 06:42

## 2019-02-15 RX ADMIN — DIGOXIN 250 MCG: 0.25 INJECTION INTRAMUSCULAR; INTRAVENOUS at 00:30

## 2019-02-15 RX ADMIN — OXYCODONE AND ACETAMINOPHEN 1 TABLET: 5; 325 TABLET ORAL at 06:56

## 2019-02-15 RX ADMIN — IPRATROPIUM BROMIDE AND ALBUTEROL SULFATE 1 AMPULE: .5; 3 SOLUTION RESPIRATORY (INHALATION) at 12:02

## 2019-02-15 RX ADMIN — METOPROLOL TARTRATE 25 MG: 25 TABLET ORAL at 04:05

## 2019-02-15 RX ADMIN — FENOFIBRATE 160 MG: 160 TABLET ORAL at 09:59

## 2019-02-15 ASSESSMENT — PAIN SCALES - GENERAL
PAINLEVEL_OUTOF10: 8

## 2019-02-15 ASSESSMENT — PAIN DESCRIPTION - LOCATION: LOCATION: LEG

## 2019-02-15 ASSESSMENT — PAIN DESCRIPTION - PAIN TYPE: TYPE: SURGICAL PAIN

## 2019-02-15 ASSESSMENT — PAIN DESCRIPTION - ORIENTATION: ORIENTATION: LEFT

## 2019-04-17 NOTE — PROGRESS NOTES
been doing ok until this morning developing inc SOB and hypoxia despite high flow O2 and breathing treatments. otherwise VSS and she feels ok. Unable to get SpO2 above 75%. D/w son and pt, she is already DNR/CC. They would like hospice consult and decline return to the hospital.     No confusion today, son feel's she is in her right mind. No falls since admission. Worked with PT this AM when got SOB and hypoxic    Voiding ok on her own. Brooks out. On cipro for UTI, sxs improved. No diarrhea, stools formed. HF with preserved EF/mod to severe MR/Afib: on asa, eiliquis, Cartia XT, lopressor, Demedex. Since admission has started to have hypoxia and SOB as above. No chest pain    HTN: on cartia xt, lopressor. BP's since admission have been <140/90    Hypertrig: on tricor, had been tolerating    Arthritis/chronic pain/neuropathy: has pain related to RA, on butrans patch and prn tramadol. Also on cymbalta and elavil. Not on DMARD pain controlled with above medications. Depression/anxiety: on cymbalta and elavil. Moods stable. Denies SI/HI    GERD: on omeprzole. Works well. No side effecits    Hypothyroidism: on synthroid. Tolerating. No temp intolerance    RLS: on requip. Keeps under control    PAD: on asa, eliquis. No leg pain or claudiation    Descending aortic aneurysm: no chest pain. No intervention planned per family and pt      Allergies and Medications were reviewed through the [facility] EMR. All medications reviewed and reconciled, including OTC and herbal medications.        Past Medical History:   Diagnosis Date    Anxiety     Atrial fibrillation (HCC)     Chronic pain syndrome     Chronic venous insufficiency 3/16/2017    COPD (chronic obstructive pulmonary disease) (HCC)     [facility]cending thoracic [facility] ([facility] [address]     Female bladder prolapse     Former smoker     GERD (gastroesophageal reflux disease)     Heart failure with preserved ejection fraction ([facility] [address]     History of left hip Environmental allergies, Food allergies  Endocrine:  Heat Intolerance, Cold Intolerance, Polydipsia, Polyphagia, Polyuria      PHYSICAL EXAM:  Vitals:    04/18/19 0545   BP: 119/62   Pulse: 76   Resp: 18   Temp: 97.3 °F (36.3 °C)   SpO2: (!) 75%   Weight: 110 lb 6.4 oz (50.1 kg)   Height: 5' 1\" (1.549 m)     Body mass index is 20.86 kg/m². Pain: 4 (joints)    VS Reviewed  General Appearance: well developed and well- nourished, in no acute distress  Head: normocephalic and atraumatic  Eyes: pupils equal, round, and reactive to light, conjunctivae and eye lids without erythema  ENT: external ear and ear canal normal bilaterally, nose without deformity, nasal mucosa and turbinates normal without polyps, oropharynx normal, dentition is normal for age, no lip or gum lesions noted  Neck: supple and non-tender without mass, no thyromegaly or thyroid nodules, no cervical lymphadenopathy  Pulmonary/Chest: distant with good air movement bilaterally. Scattered wheezing and rhonchi. No accessory muscle use. no distress. Cardiovascular: distant with normal rate, irregular rhythm, normal S1 and S2, no murmurs, rubs, clicks, or gallops, distal pulses intact  Abdomen: soft, non-tender, non-distended, bowel sounds physiologic,  no rebound or guarding, no masses or hernias noted. Liver and spleen without enlargement. Extremities: no cyanosis, clubbing or edema of the lower extremities  Musculoskeletal: No joint swelling or gross deformity   Neuro:  Alert, 2+ patellar reflexes b/l,  normal speech, no focal findings or movement disorder noted  Psych:  Normal affect without evidence of depression or anxiety, insight and judgement are appropriate, memory appears intact  Skin: warm and dry, no rash or erythema, scattered bruising in various stages of healing.   Lymph:  No cervical, auricular or supraclavicular lymph nodes palpated      LABS/IMAGING    CBC 14 APR 2019  CBC with Differential      WBC                              7.9 4. 4-10.5 th/cmm      RBC                              2.88                 L     4.00-5.10 mil/cmm      Hemoglobin                       9.0                 # L    12.0-15.0 gm/dL      Hematocrit                       27.3                # L    35.0-44.0 %      MCV                              94.7                       80-97 CU JOHN      MCH                              31.1                       27.5-33.0 PG      MCHC                             32.9                 L     33.0-36.0 gm/dL      RDW                              16.4                 H     12.0-16.0 %      Platelet Count                   382                        150-400 th/cmm      Auto Diff        Neut-Auto Diff                 75.7                 H     40-70 %        Lymph- Auto Diff               12.0                 L     15-45 %        Mono- Auto Diff                10.6                 H     2-10 %        EOS-Auto Diff                  1.2                        0-6 %        Baso- Auto Diff                0.5                        0-2 %        NRBC-Auto                      0.0                        <1 /100 WBC      Absolute Cell Count        Abs Neut Count                 6000                       0701-1819 /cmm        Abs Lymph Count                1000                       7191-8547 /cmm        Abs Mono Count                 800                        0-800 /cmm        Abs Eos Count                  100                        0-500 /cmm        Abs Baso Count                  0                         0-200 /cmm      Potassium 14 APR 2019   Potassium                          4.7                 #      3.6-5.0 mEq/L      BMP 14 APR 7270  Basic Metabolic,Non-Fasting        Sodium                         136                 #      135-145 mEq/L        Potassium                      2.7                 *L     3.6-5.0 mEq/L                           This result was called to and read back by Yoon Mosqueda, RN                           (by [facility] at [address], 04/14/19)        Chloride                        92                 # L    101-111 mEq/L        Carbon Dioxide                  30                        21-32 mEq/L        Anion Gap                       14                  H     4-12        Glucose                         85                         mg/dL                           *This reference range applies                            to fasting specimens only. BUN                             40                  H     7-20 mg/dL        Creatinine                     1.13                       0. 60-1.30 mg/dL        GFR Calculation                 46                  L                           AGE(years)       AVERAGE GFR                            70+             75 ml/min/1.73 square meters                           Note:This result is normalized to 1.73 square meter                                body surface area. Height and weight are not                                factored. Chronic Kidney Disease stages by NKDF                           Stage       eGFR                           --------------------------                            I           >90                            II          60-89                            III         30-59                            IV          15-29                            V           <15 or dialysis        Calcium                        7.7                  L     8.8-10.5 mg/dL    Magnesium                          1.8                        1.8-2.5 mg/dL      Exam Date: 04/17/19  Accession #:  [id number]  Exam:  MAIN   XR Foot Rt. Routine Min 3 View  Result:    STUDY:   X-RAY - RIGHT FOOT     CLINICAL:   Female, 78years old. Pain and swelling     TECHNIQUE:   3 view(s) of the foot.      COMPARISON:   None.   ___________________________________     FINDINGS:   Bones are diffusely demineralized with degenerative arthrosis noted at all   visualized joint spaces with hammertoe deformities noted at all   metatarsophalangeal joints. No demonstrated fracture or suspicious osseous lesion   ___________________________________     IMPRESSION:   Diffuse osteopenia with degenerative arthrosis, no demonstrated fracture     Electronically Signed:   Jameson Ness MD   2019/04/17 at 11:21 EDT   Tel [phone], Service support  [phone], Fax [phone]      Exam Date: 04/14/19  Accession #:  [id number]  Exam:  [address] [facility] [address]  Result:    STUDY:   VENOUS DOPPLER ULTRASOUND - BILATERAL LOWER EXTREMITIES     REASON FOR EXAM:   Female, 78years old. Right leg edema. TECHNIQUE:  Ultrasound evaluation of the deep vein system to include   gray-scale imaging and compression was performed. Gray-scale imaging and   Doppler sonographic evaluation, including duplex spectral analysis and   qualitative color flow sonography, was performed. COMPARISON:   None.   ___________________________________     FINDINGS:     RIGHT LEG   Common Femoral Vein:  Normal compression, spontaneity and augmentation. Normal color Doppler. Common Femoral Vein/Greater Saphenous Junction:  Normal compression,   spontaneity and augmentation. Normal color Doppler. Deep Femoral Vein:  Normal compression, spontaneity and augmentation. Normal color Doppler. Femoral Proximal:  Normal compression, spontaneity and augmentation. Normal color Doppler. Femoral Middle:  Normal compression, spontaneity and augmentation. Normal   color Doppler. Femoral Distal:  Normal compression, spontaneity and augmentation. Normal   color Doppler. Popliteal Vein:  Normal compression, spontaneity and augmentation. Normal   color Doppler. Posterior Tibial Vein:  Normal compression, spontaneity and augmentation. Normal color Doppler. Peroneal Vein:  Normal compression, spontaneity and augmentation. Normal   color Doppler.      LEFT LEG Common Femoral Vein:  Normal compression, spontaneity and augmentation. Normal color Doppler. Common Femoral Vein/Greater Saphenous Junction:  Normal compression,   spontaneity and augmentation. Normal color Doppler. Deep Femoral Vein:  Normal compression, spontaneity and augmentation. Normal color Doppler. Femoral Proximal:  Normal compression, spontaneity and augmentation. Normal color Doppler. Femoral Middle:  Normal compression, spontaneity and augmentation. Normal   color Doppler. Femoral Distal:  Normal compression, spontaneity and augmentation. Normal   color Doppler. Popliteal Vein:  Normal compression, spontaneity and augmentation. Normal   color Doppler. Posterior Tibial Vein:  Normal compression, spontaneity and augmentation. Normal color Doppler. Peroneal Vein:  Normal compression, spontaneity and augmentation. Normal   color Doppler. ___________________________________     IMPRESSION:   No evidence of deep venous thrombosis. Electronically Signed:   Lona Eddy DO   2019/04/14 at 11:18 EDT   Tel [phone], Service support  [phone], Fax [phone]      ECHO 18 MARCH 2019   Summary     Moderate-severe mitral regurgitation is present. Normal LV size and function   Ejection fraction is visually estimated at 55%. Left ventricular diastolic relaxation abnormality is noted. Right ventricular systolic pressure of 43 mm Hg consistent with mild   pulmonary hypertension. Mild-moderately dilated left atrium. No evidence of atrial septal defect. Mild aortic valve regurgitation   Mild mitral annular calcification is present. Tricuspid valve is structurally normal.   Mild-moderate tricuspid regurgitation. Trace pulmonic regurgitation present. No evidence of any pericardial effusion. Dilated IVC with poor inspiration collapse consistent with elevated right   atrial pressure.       Narrative   PROCEDURE: CTA CHEST W WO CONTRAST       CLINICAL INFORMATION: Shortness of breath, recent hip surgery.       COMPARISON: Chest x-ray earlier today       TECHNIQUE: 3 mm thick by 1.5 mm interval axial images were obtained through the chest after the administration of IV contrast.  A non-contrast localizer was obtained.  Sagittal and coronal MIP 3D reconstructions were performed on the scanner. 80 mL    Isovue-370 were administered intravenously.       All CT scans at this facility use dose modulation, iterative reconstruction, and/or weight-based dosing when appropriate to reduce radiation dose to as low as reasonably achievable.       FINDINGS:   Lungs: There is no pneumonia or mass. There is bilateral upper and lower lobe dependent atelectasis. There are moderate centrilobular emphysematous changes in the lungs. There is a subcentimeter posterior lateral right upper lobe calcified granuloma.       Pleura: There are moderate bilateral pleural effusions, slightly larger on the right. There is no pneumothorax.       Heart: Heart size is normal. There are coronary artery calcifications. There is no pericardial effusion.       Pulmonary vasculature: There is adequate opacification of the pulmonary arteries. There is no pulmonary embolism.       Molly and mediastinum: There is mild subcarinal adenopathy with a calcified lymph node. .       Thoracic aorta/vascular: There is aneurysmal dilatation of the descending thoracic aorta with diameter of 4.5 by 4.6 cm distally. There is either a short segment Matt B dissection versus mural thrombus along the left posterior lateral wall. .   The imaged brachiocephalic arteries are unremarkable.       Imaged upper abdomen: Unremarkable       Musculoskeletal system: There are numerous old appearing moderate to severe thoracic vertebral compression fractures. Patient is status post L1 vertebral augmentation procedure. There is an old healed mid sternal fracture.  There are old healed left rib    fractures.       Chest/body wall soft tissues: Unremarkable       Thyroid: Unremarkable               Impression       No acute pulmonary embolism. 4.5 x 4.6 cm distal descending thoracic aortic aneurysm with short segment [place] B dissection versus mural thrombus. Moderate bilateral pleural effusions. Bilateral upper and lower lobe dependent atelectasis. Moderate centrilobular emphysema. No acute pneumonia. Additional findings as detailed above.           **This report has been created using voice recognition software. It may contain minor errors which are inherent in voice recognition technology. **       Final report electronically signed by Dr. Lucero Leroy on 2/11/2019 5:50 PM       ASSESSMENT & PLAN  1. Acute respiratory failure with hypoxia [facility])    Discussed options with pt and son. She and he do not want her to return to hospital  They prefer comfort measures  Hospice consulted  Currently comfortable  con't xopenex, budesonide    Pain controlled with butrens and tramadol  Will add prn lorazpam 2mg/ml, 0.5mg q2h prn  All questions answered    2. Chronic obstructive pulmonary disease, unspecified COPD type ([facility] [address]    As per # 1    3. Metabolic encephalopathy    Clear today  Confusion resolved    4. Fall in home, initial encounter    Did PT today  Going hospice    5. STEPHANY (acute kidney injury) ([facility] [address]    Hospice     6. Urinary retention    Resolved     7. Acute urinary tract infection    Finish cipro    8. Normocytic anemia    Appears stable  DNR/CC, hospice    9. Clostridium difficile diarrhea    Appears resolved. Monitor    10. Physical deconditioning    Hospice pt now, no furhter PT    11. Heart failure with preserved ejection fraction (HCC)    resp failure could be d/t this or COPD or both  Comfort measures as above    12. Moderate to severe mitral regurgitation    As above    13.  Atrial fibrillation, unspecified type ([facility] [address]    con't rate control and [address] for now  Once officially signed on to hospice, can wean off meds as Electronically signed by Nguyen Salcedo DO on 4/18/2019 at 5:16 PM

## 2019-04-18 ENCOUNTER — OUTSIDE SERVICES (OUTPATIENT)
Dept: FAMILY MEDICINE CLINIC | Age: 79
End: 2019-04-18
Payer: MEDICARE

## 2019-04-18 VITALS
DIASTOLIC BLOOD PRESSURE: 62 MMHG | TEMPERATURE: 97.3 F | WEIGHT: 110.4 LBS | OXYGEN SATURATION: 75 % | SYSTOLIC BLOOD PRESSURE: 119 MMHG | BODY MASS INDEX: 20.84 KG/M2 | HEIGHT: 61 IN | RESPIRATION RATE: 18 BRPM | HEART RATE: 76 BPM

## 2019-04-18 DIAGNOSIS — I73.9 PAD (PERIPHERAL ARTERY DISEASE) (HCC): ICD-10-CM

## 2019-04-18 DIAGNOSIS — F41.9 ANXIETY: ICD-10-CM

## 2019-04-18 DIAGNOSIS — J96.01 ACUTE RESPIRATORY FAILURE WITH HYPOXIA (HCC): Primary | ICD-10-CM

## 2019-04-18 DIAGNOSIS — I10 ESSENTIAL HYPERTENSION: ICD-10-CM

## 2019-04-18 DIAGNOSIS — G89.4 CHRONIC PAIN SYNDROME: ICD-10-CM

## 2019-04-18 DIAGNOSIS — J44.9 CHRONIC OBSTRUCTIVE PULMONARY DISEASE, UNSPECIFIED COPD TYPE (HCC): ICD-10-CM

## 2019-04-18 DIAGNOSIS — E78.1 HYPERTRIGLYCERIDEMIA: ICD-10-CM

## 2019-04-18 DIAGNOSIS — G62.9 NEUROPATHY: ICD-10-CM

## 2019-04-18 DIAGNOSIS — N39.0 ACUTE URINARY TRACT INFECTION: ICD-10-CM

## 2019-04-18 DIAGNOSIS — I48.91 ATRIAL FIBRILLATION, UNSPECIFIED TYPE (HCC): ICD-10-CM
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
## 2019-04-18 DIAGNOSIS — G93.41 METABOLIC ENCEPHALOPATHY: ICD-10-CM

## 2019-04-18 DIAGNOSIS — E03.9 HYPOTHYROIDISM, UNSPECIFIED TYPE: ICD-10-CM

## 2019-04-18 DIAGNOSIS — R53.81 PHYSICAL DECONDITIONING: ICD-10-CM

## 2019-04-18 DIAGNOSIS — G25.81 RLS (RESTLESS LEGS SYNDROME): ICD-10-CM

## 2019-04-18 DIAGNOSIS — N17.9 AKI (ACUTE KIDNEY INJURY) (HCC): ICD-10-CM

## 2019-04-18 DIAGNOSIS — I50.30 HEART FAILURE WITH PRESERVED EJECTION FRACTION (HCC): ICD-10-CM

## 2019-04-18 DIAGNOSIS — K21.9 GASTROESOPHAGEAL REFLUX DISEASE, ESOPHAGITIS PRESENCE NOT SPECIFIED: ICD-10-CM
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
## 2019-04-18 DIAGNOSIS — D64.9 NORMOCYTIC ANEMIA: ICD-10-CM

## 2019-04-18 DIAGNOSIS — R33.9 URINARY RETENTION: ICD-10-CM

## 2019-04-18 DIAGNOSIS — M06.9 RHEUMATOID ARTHRITIS INVOLVING MULTIPLE SITES, UNSPECIFIED RHEUMATOID FACTOR PRESENCE: ICD-10-CM

## 2019-04-18 DIAGNOSIS — I71.9 DESCENDING AORTIC ANEURYSM (HCC): ICD-10-CM

## 2019-04-18 DIAGNOSIS — A04.72 CLOSTRIDIUM DIFFICILE DIARRHEA: ICD-10-CM

## 2019-04-18 DIAGNOSIS — F33.42 RECURRENT MAJOR DEPRESSIVE DISORDER, IN FULL REMISSION (HCC): ICD-10-CM

## 2019-04-18 DIAGNOSIS — I34.0 MODERATE TO SEVERE MITRAL REGURGITATION: ICD-10-CM

## 2019-04-18 PROCEDURE — 1123F ACP DISCUSS/DSCN MKR DOCD: CPT | Performed by: FAMILY MEDICINE

## 2019-04-18 PROCEDURE — 99305 1ST NF CARE MODERATE MDM 35: CPT | Performed by: FAMILY MEDICINE

## 2019-04-22 NOTE — PROGRESS NOTES
H&P (Readmission H&P at [facility])        NAME: Ramu Matta  DATE: 19  ROOM #: [id number]  CODE STATUS: DNR/CCA  REASON FOR ADMISSION: weakness after hospitalization  : 1940  ADMISSION DATE: 2019  READMISSION DATE: 2019  SKILLED PATIENT: Yes    History obtained from chart review, the patient, son and nursing staff. SUBJECTIVE:  HPI: Ramu Matta is a 78 y.o. female. Pt seen and examined at bedside. Patient admitted to [facility] from  to  for acute hypoxic respiratory failure and acute blood loss anemia. D/c summary:  Presentation: ( Pt is an 58-year-old  female, with a past medical history that includes atrial fibrillation for which the patient is on Cardizem 180 mg sustained release, history of a questionable recent GI bleed, for which GI has been consulted, and patient underwent an EGD recently, patient also has a history of recurrent falling, status post a recent left hip fracture and ORIF, she was discharged yesterday to a subacute rehabilitation after a prolonged stay and [facility]. There, patient started getting short of breath. She is supposed to be on 2 L nasal cannula, but was requiring higher settings, so was placed on 4 L then 6 L of nasal cannula in the nursing home and eventually was sent back to the ER for further evaluation. Here, patient continued having respiratory distress, so she was originally placed on a nonrebreather, and then she was De-escalated to 24 L nasal cannula after her heart rate has been controlled with the a 15 mg IV push of Cardizem given in the ER. Originally, her heart rate was in the 130s on presentation, and after the one bolus dose of Cardizem her heart rate dropped to the 80s and 90s but was still in atrial fibrillation. Patient's hemoglobin is 7.2 in the ER, and this is down from a hemoglobin of 11.5 on 2019, a hemoglobin of 9.2 on 2019 but no blood count has been done after that date.  Patient had a positive fecal occult blood test and dark blood in her stool during this ER visit. Patient is on Eliquis for her atrial fibrillation and that will be stopped, aspirin will be held, gastroenterology will be consulted and a repeat H and H will be ordered. 2 units of PRBC will be given as patient's acute anemia is a direct cause of her shortness of breath and possibly her worsening tachycardia. I spoke to patient's son, Maki Calvillo, who is present at bedside, and he is very upset and dissatisfied with patient's recent care, and upset that she was discharged yesterday and was readmitted today. He provided his phone number, [phone]- [phone] and would like to be updated and contacted about any new changes in patient's condition)     Course: Patient was admitted, blood transfusions given, and GI were consulted for patient's bleeding. Eliquis was stopped, and aspirin was temporarily held until hemoglobins become stable. Repeat hemoglobin were done, and levels seemed to stabilize. GI underwent an EGD and colonoscopy on the patient but no intervention was done, no active bleeding was noted, please refer back to GI. Patient was continued on Protonix. Patient has been stable for the past 2 days, with no dizziness, no weakness, no lightheadedness or chest pain. Labs are stable as well. Patient has a chronic respiratory failure and is on nasal cannula aspirin was resumed yesterday, and hemoglobin on repeat today is stable still. Patient will be discharged to subacute rehabilitation. Since readmission has done well. Breathing at baseline. Denies CP, SOB. No wheezing. No bleeding, melena or hematochezia. No palpitations or tachycardia      LAST VISIT  Patient admitted to [facility] from 4/13 to 4/17 for AMS, falls and confusion. D/c summary:  [facility] Course 78-year-old female brought to the emergency room to be evaluated for confusion. Patient is unable to provide history.  History is obtained from the ED provider in the ED records. Son stated that patient was confused, fell, was combative. She also sustained laceration in the lower extremities and right side of the face. Son reported that patient was talking to people who were not present in the room. Workup in the emergency room showed acute on chronic renal failure, CT of the brain was negative for CVA. Bladder scan showed urinary retention, Brooks catheter was placed. Patient is admitted for further evaluation. Following admission, patient was in atrial fibrillation RVR requiring Cardizem drip, which was titrated off. Patient is currently rate controlled, much improved in her mental status, not in acute distress. At this time patient is deemed not safe to return home following physical therapy evaluation and occupational therapy evaluation, and also patient has had multiple admissions from home. At this time patient is agreeable to placement, and precertification has been started for nursing home placement. Patient will be discharged once approved. Also, not noted in d/c summary but revealed in labs, appears may have been treated for c. Diff. + screen on admission there. She was ordered f/u stools at d/c by hospital. However, no diarrhea, stools are formed since admission    Since admission last evening had been doing ok until this morning developing inc SOB and hypoxia despite high flow O2 and breathing treatments. otherwise VSS and she feels ok. Unable to get SpO2 above 75%. D/w son and pt, she is already DNR/CC. They would like hospice consult and decline return to the hospital.     No confusion today, son feel's she is in her right mind. No falls since admission. Worked with PT this AM when got SOB and hypoxic    Voiding ok on her own. Brooks out. On cipro for UTI, sxs improved. No diarrhea, stools formed. UPDATE TODAY: confusion con't to be resolved. No SOB. No hypoxia.  Initially pt and son wanted hospice when she was hypoxia, but then they changed their minds, thus she was readmitted to [facility] as above. Voiding on her own yet, on cipro for UTI, sxs improved. No diarrhea, stools are formed yet. Working with PT to get strength back. HF with preserved EF/mod to severe MR LAST VISIT: on asa, eiliquis, Cartia XT, lopressor, Demedex. Since admission has started to have hypoxia and SOB as above. No chest pain    UPDATE TODAY: on asa, IR dilt and lopressor. eliquis stopped d/t bleeding as per d/c summary. demedex stopped as well. Denies leg swelling, orthopnea or PND. HTN LAST VISIT: on cartia xt, lopressor. BP's since admission have been <140/90    UPDATE TODAY: on diltiazem TID and lopressor. BP's since admission have been <140/90. Hypertrig: on tricor, had been tolerating    Arthritis/chronic pain/neuropathy: has pain related to RA, on butrans patch and prn tramadol. Also on cymbalta, was on elavil, but this was stopped during admission. Not on DMARD pain controlled with above medications. Depression/anxiety: on cymbalta. Was on elavil, but was stopped in the hospital. Moods stable. Denies SI/HI    GERD: on omeprzole. Works well. No side effecits    Hypothyroidism: on synthroid. Tolerating. No temp intolerance    RLS: on requip. Keeps under control    PAD: on asa. No leg pain or claudiation. Was on eliquis, but stopped d/t bleeding as above. Descending aortic aneurysm: no chest pain. No intervention planned per family and pt      Allergies and Medications were reviewed through the [facility] EMR. All medications reviewed and reconciled, including OTC and herbal medications.        Past Medical History:   Diagnosis Date    Anxiety     Atrial fibrillation (HCC)     Chronic pain syndrome     Chronic venous insufficiency 3/16/2017    COPD (chronic obstructive pulmonary disease) (HCC)     Descending thoracic aortic aneurysm ([facility] [address]     Female bladder prolapse     Former smoker     GERD (gastroesophageal reflux disease)     Heart failure with preserved ejection fraction (HCC)     History of left hip fracture     Hypertension     Hypertriglyceridemia     Hypothyroidism     Major depression     Moderate to severe mitral regurgitation     Neuropathy     Osteoporosis     PAD (peripheral artery disease) ([facility] [address] 3/16/2017    Rheumatoid arthritis (HCC)     RLS (restless legs syndrome)        Past Surgical History:   Procedure Laterality Date    APPENDECTOMY      BACK SURGERY      spinal fusion    CHOLECYSTECTOMY      HYSTERECTOMY      ROTATOR CUFF REPAIR Left     TOTAL HIP ARTHROPLASTY Left     re: hip fxr       No Known Allergies    Social History     Tobacco Use    Smoking status: Former Smoker     Packs/day: 0.50     Types: Cigarettes    Smokeless tobacco: Never Used   Substance Use Topics    Alcohol use: No        No family history on file. I have reviewed the patient's past medical history, past surgical history, allergies, medications, social and family history and I have made updates where appropriate.       Review of Systems  Positive responses are highlighted in bold    Constitutional:  Fever, Chills, Night Sweats, Fatigue, Unexpected changes in weight  Eyes:  Eye discharge, Eye pain, Eye redness, Visual disturbances   HENT:  Ear pain, Tinnitus, Nosebleeds, Trouble swallowing, Hearing loss, Sore throat  Cardiovascular:  Chest Pain, Palpitations, Orthopnea, Paroxysmal Nocturnal Dyspnea  Respiratory:  Cough, Wheezing, Shortness of breath, Chest tightness, Apnea  Gastrointestinal:  Nausea, Vomiting, Diarrhea, Constipation, Heartburn, Blood in stool  Genitourinary:  Difficulty or painful urination, Flank pain, Change in frequency, Urgency  Skin:  Color change, Rash, Itching, Wound  Psychiatric:  Hallucinations, Anxiety, Depression, Suicidal ideation  Hematological:  Enlarged glands, Easy bleeding, Easily bruising  Musculoskeletal:  Joint pain, Back pain, Gait problems, Joint swelling, Myalgias  Neurological:  Dizziness, Headaches, Presyncope, Numbness, Seizures, Tremors  Allergy:  Environmental allergies, Food allergies  Endocrine:  Heat Intolerance, Cold Intolerance, Polydipsia, Polyphagia, Polyuria      PHYSICAL EXAM:  Vitals:    04/25/19 0545   BP: 134/78   Pulse: 78   Resp: 18   Temp: 97 °F (36.1 °C)   Weight: 117 lb 9.6 oz (53.3 kg)   Height: 5' 1\" (1.549 m)     Body mass index is 22.22 kg/m². Pain: 4 (joints)    VS Reviewed  General Appearance: well developed and well- nourished, in no acute distress  Head: normocephalic and atraumatic  Eyes: pupils equal, round, and reactive to light, conjunctivae and eye lids without erythema  ENT: external ear and ear canal normal bilaterally, nose without deformity, nasal mucosa and turbinates normal without polyps, oropharynx normal, dentition is normal for age, no lip or gum lesions noted  Neck: supple and non-tender without mass, no thyromegaly or thyroid nodules, no cervical lymphadenopathy  Pulmonary/Chest: distant with good air movement bilaterally. Scattered wheezing, no crackles or rhonchi. No accessory muscle use. no distress. Cardiovascular: distant with normal rate, irregular rhythm, normal S1 and S2, no murmurs appreciated by this observier, rubs, clicks, or gallops, distal pulses intact  Abdomen: soft, non-tender, non-distended, bowel sounds physiologic,  no rebound or guarding, no masses or hernias noted. Liver and spleen without enlargement. Extremities: no cyanosis, clubbing or edema of the lower extremities  Musculoskeletal: No joint swelling or gross deformity   Neuro:  Alert, 2+ patellar reflexes b/l,  normal speech, no focal findings or movement disorder noted  Psych:  Normal affect without evidence of depression or anxiety, insight and judgement are appropriate, memory appears intact  Skin: warm and dry, no rash or erythema, scattered bruising in various stages of healing.   Lymph:  No cervical, auricular or supraclavicular lymph nodes Hemoglobin                       10.3                 L     12.0-15.0 gm/dL      Hematocrit                       31.4                 L     35.0-44.0 %      MCV                              94.0                       80-97 CU JOHN      MCH                              30.8                       27.5-33.0 PG      MCHC                             32.8                 L     33.0-36.0 gm/dL      RDW                              19.2                 H     12.0-16.0 %      Platelet Count                   412                  H     150-400 th/cmm      Exam Date: 04/18/19  Accession #:  [id number]  Exam:  MAIN   XR Chest 1 View Portable  Result:    STUDY:   X-RAY CHEST     REASON FOR EXAM:   Female, 78years old. Altered metal status, shortness   of breath for a few days. Pneumonia one month ago. Atrial fibrillation and   hypertension. TECHNIQUE:   Portable chest     COMPARISON:   4/13/2019   ___________________________________     FINDINGS:     Right lower lung pulmonary nodule, calcified, old granulomatous disease. Compared to imaging of 4/13/2019, there are new hazy and partially   confluent opacities in the right lower lobe and there is blunting of the   right costophrenic angle. These features are consistent with the presence   of small layering right pleural effusion, right lung base atelectasis,   underlying pneumonia not excluded. The left lung is clear. Normal cardiomediastinal silhouette, desi and pleural margins. No acute osseous or upper abdominal process. ___________________________________     IMPRESSION:   Right effusion, right lung base atelectasis, pneumonia not entirely   excluded. These features are new compared to recent prior imaging of   4/13/2019.      Electronically Signed:   Yuval Zaidi MD   2019/04/18 at 14:01 EDT   Tel [phone], Service support  [phone], Fax [phone]      Exam Date: 04/14/19  Accession #:  [id number]  Exam:  US   US Myron Dopp Legs Bilat [id number]  Result:    STUDY:   VENOUS DOPPLER ULTRASOUND - BILATERAL LOWER EXTREMITIES     REASON FOR EXAM:   Female, 78years old. Right leg edema. TECHNIQUE:  Ultrasound evaluation of the deep vein system to include   gray-scale imaging and compression was performed. Gray-scale imaging and   Doppler sonographic evaluation, including duplex spectral analysis and   qualitative color flow sonography, was performed. COMPARISON:   None.   ___________________________________     FINDINGS:     RIGHT LEG   Common Femoral Vein:  Normal compression, spontaneity and augmentation. Normal color Doppler. Common Femoral Vein/Greater Saphenous Junction:  Normal compression,   spontaneity and augmentation. Normal color Doppler. Deep Femoral Vein:  Normal compression, spontaneity and augmentation. Normal color Doppler. Femoral Proximal:  Normal compression, spontaneity and augmentation. Normal color Doppler. Femoral Middle:  Normal compression, spontaneity and augmentation. Normal   color Doppler. Femoral Distal:  Normal compression, spontaneity and augmentation. Normal   color Doppler. Popliteal Vein:  Normal compression, spontaneity and augmentation. Normal   color Doppler. Posterior Tibial Vein:  Normal compression, spontaneity and augmentation. Normal color Doppler. Peroneal Vein:  Normal compression, spontaneity and augmentation. Normal   color Doppler. LEFT LEG   Common Femoral Vein:  Normal compression, spontaneity and augmentation. Normal color Doppler. Common Femoral Vein/Greater Saphenous Junction:  Normal compression,   spontaneity and augmentation. Normal color Doppler. Deep Femoral Vein:  Normal compression, spontaneity and augmentation. Normal color Doppler. Femoral Proximal:  Normal compression, spontaneity and augmentation. Normal color Doppler. Femoral Middle:  Normal compression, spontaneity and augmentation. Normal   color Doppler.    Femoral Distal: cymbalta    Antipsychotic/Antianxiety/Hypnotic/Psychotropic/Sedation/Antidepressant medications are continued at this time because discontinuation may result in adverse effects or return of concerning behaviors/symptoms. 20. Anxiety     As per # 19    21. Gastroesophageal reflux disease, esophagitis presence not specified    con't PPI  Doing well    22. Hypothyroidism, unspecified type    Stable  con't synthroid    23. RLS (restless legs syndrome)    con't requip    24. PAD (peripheral artery disease) (HCC)    con't secondary prevention with ASA  Not on statin, defer to PCP at d/c    25. Descending aortic aneurysm ([facility] [address]    Will monitor for s/s   Per old notes, family declined intervention  con't BB and BP control  F/u PCP OP      DNR/CCA. Con't PT/OT. Reassess 30 days, sooner prn.        Electronically signed by Josy Zacarias DO on 4/25/2019 at 10:24 AM

## 2019-04-25 ENCOUNTER — OUTSIDE SERVICES (OUTPATIENT)
Dept: FAMILY MEDICINE CLINIC | Age: 79
End: 2019-04-25
Payer: MEDICARE

## 2019-04-25 VITALS
HEIGHT: 61 IN | DIASTOLIC BLOOD PRESSURE: 78 MMHG | WEIGHT: 117.6 LBS | TEMPERATURE: 97 F | RESPIRATION RATE: 18 BRPM | HEART RATE: 78 BPM | BODY MASS INDEX: 22.2 KG/M2 | SYSTOLIC BLOOD PRESSURE: 134 MMHG

## 2019-04-25 DIAGNOSIS — J44.9 CHRONIC OBSTRUCTIVE PULMONARY DISEASE, UNSPECIFIED COPD TYPE (HCC): ICD-10-CM
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
## 2019-04-25 DIAGNOSIS — K21.9 GASTROESOPHAGEAL REFLUX DISEASE, ESOPHAGITIS PRESENCE NOT SPECIFIED: ICD-10-CM

## 2019-04-25 DIAGNOSIS — I34.0 MODERATE TO SEVERE MITRAL REGURGITATION: ICD-10-CM

## 2019-04-25 DIAGNOSIS — J96.21 ACUTE ON CHRONIC RESPIRATORY FAILURE WITH HYPOXIA (HCC): Primary | ICD-10-CM

## 2019-04-25 DIAGNOSIS — G89.4 CHRONIC PAIN SYNDROME: ICD-10-CM

## 2019-04-25 DIAGNOSIS — R33.9 URINARY RETENTION: ICD-10-CM

## 2019-04-25 DIAGNOSIS — D62 ACUTE BLOOD LOSS ANEMIA: ICD-10-CM

## 2019-04-25 DIAGNOSIS — E03.9 HYPOTHYROIDISM, UNSPECIFIED TYPE: ICD-10-CM

## 2019-04-25 DIAGNOSIS — G62.9 NEUROPATHY: ICD-10-CM

## 2019-04-25 DIAGNOSIS — I50.30 HEART FAILURE WITH PRESERVED EJECTION FRACTION (HCC): ICD-10-CM

## 2019-04-25 DIAGNOSIS — G25.81 RLS (RESTLESS LEGS SYNDROME): ICD-10-CM

## 2019-04-25 DIAGNOSIS — G93.41 METABOLIC ENCEPHALOPATHY: ICD-10-CM

## 2019-04-25 DIAGNOSIS — I73.9 PAD (PERIPHERAL ARTERY DISEASE) (HCC): ICD-10-CM

## 2019-04-25 DIAGNOSIS — F41.9 ANXIETY: ICD-10-CM

## 2019-04-25 DIAGNOSIS — N17.9 AKI (ACUTE KIDNEY INJURY) (HCC): ICD-10-CM

## 2019-04-25 DIAGNOSIS — I48.91 ATRIAL FIBRILLATION WITH RVR (HCC): ICD-10-CM
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
## 2019-04-25 DIAGNOSIS — N39.0 ACUTE URINARY TRACT INFECTION: ICD-10-CM

## 2019-04-25 DIAGNOSIS — I10 ESSENTIAL HYPERTENSION: ICD-10-CM

## 2019-04-25 DIAGNOSIS — F33.42 RECURRENT MAJOR DEPRESSIVE DISORDER, IN FULL REMISSION (HCC): ICD-10-CM

## 2019-04-25 DIAGNOSIS — E78.1 HYPERTRIGLYCERIDEMIA: ICD-10-CM

## 2019-04-25 DIAGNOSIS — A04.72 CLOSTRIDIUM DIFFICILE DIARRHEA: ICD-10-CM

## 2019-04-25 DIAGNOSIS — M06.9 RHEUMATOID ARTHRITIS INVOLVING MULTIPLE SITES, UNSPECIFIED RHEUMATOID FACTOR PRESENCE: ICD-10-CM

## 2019-04-25 DIAGNOSIS — R53.81 PHYSICAL DECONDITIONING: ICD-10-CM

## 2019-04-25 DIAGNOSIS — I71.9 DESCENDING AORTIC ANEURYSM (HCC): ICD-10-CM

## 2019-04-25 PROCEDURE — 1123F ACP DISCUSS/DSCN MKR DOCD: CPT | Performed by: FAMILY MEDICINE

## 2019-04-25 PROCEDURE — 99310 SBSQ NF CARE HIGH MDM 45: CPT | Performed by: FAMILY MEDICINE

## 2019-05-09 ENCOUNTER — OUTSIDE SERVICES (OUTPATIENT)
Dept: FAMILY MEDICINE CLINIC | Age: 79
End: 2019-05-09
Payer: MEDICARE

## 2019-05-09 DIAGNOSIS — F41.9 ANXIETY: Primary | ICD-10-CM

## 2019-05-09 PROCEDURE — 99309 SBSQ NF CARE MODERATE MDM 30: CPT | Performed by: NURSE PRACTITIONER

## 2019-05-09 NOTE — PROGRESS NOTES
H&P (Readmission H&P at [facility])      NAME: Blane Krause  DATE: 05/10/19  ROOM #: [id number]  CODE STATUS: DNR/CCA  REASON FOR ADMISSION: weakness after hospitalization  : 1940  ADMISSION DATE: 2019  READMISSION DATE: 2019  SKILLED PATIENT: Yes    History obtained from chart review, the patient, son and nursing staff. SUBJECTIVE:  HPI: Blane Krause is a 78 y.o. female. Pt seen and examined at bedside. Patient admitted to [facility] from  to  for worsening SOB from AE CHF. D/c summary:  - Hospital Course  78year old female who was admitted due to Acute CHF exacerbation patient also with  history Congestive heart failure, Afib, COPD home O2, Valvular heart disease,  hypertension, Hyperlipidemia, Hypothyroidism  GERD and other comorbid conditions . Patient  diuresed well, was stable on home O2. Patient was seen by palliative care, Buspar and  Roxinal were added. Patient will f/u with Palliative at the [facility]. Echo EF 60%, mild PH. Patient deemed stable for d/c to ECF. Since readmission has been doing ok. Has on and off chronic dyspnea. Has also been having some on and off agitation and anxiety. On hospital, this was being managed by roxanol at low dose, as pt apparently did not do with bzd prior. Anyway, Despite d/c summary mentioning roxanol, she did not discharge with any orders for such. We reached out to palliative care yesterday, and they recommending to consider continuing it. Spoke with son at length last nigh, as pt was having severe agitation that her vistaril was not helping. Reviewed the risk/benefit profile of the roxanol, with the risk being respiratory depression. Also discussed possible need for IM haldol if we could not get her to taking anything PO. Risks/benefits of this approach discussed as well. He was agreeable to and appreciave of resuming roxanol prn and was also ok with a 1 time dose of haldol if needed last night. She did not need haldol last night.  Took roxanol. Helped a lot per staff. Doing better this AM. Less anxious. More calm. Breathing easier. Denies sig dyspnea. Long-term, pt needs hospice. Son aware pt is end-stage. Barrier to hopsice is financial at the moment. Son is working to remove these barriers and hope to take her home on hospice soon. In the mean time, prefers she remain DNR/CCA and skilled. But if she were to decline, that she be treated as hospice pt. I'm agreeable. He is agreeable. HF with preserved EF/mod to severe MR PRIOr VISIT: on asa, eiliquis, Cartia XT, lopressor, Demedex. Since admission has started to have hypoxia and SOB as above. No chest pain    UPDATE LAST VISIT: on asa, IR dilt and lopressor. eliquis stopped d/t bleeding as per d/c summary. demedex stopped as well. Denies leg swelling, orthopnea or PND. UPDATE TODAY: breathing as above. Back on diuretic, lasix. (stopped by hosital doing last hospitalization), on IR tilt, lopressor. eliquis stopped d/t bleeding as above. No leg swelling. LAST VISIT (25 APR 2019)  Patient admitted to [facility] from 4/18 to 4/22 for acute hypoxic respiratory failure and acute blood loss anemia. D/c summary:  Presentation: ( Pt is an 47-year-old  female, with a past medical history that includes atrial fibrillation for which the patient is on Cardizem 180 mg sustained release, history of a questionable recent GI bleed, for which GI has been consulted, and patient underwent an EGD recently, patient also has a history of recurrent falling, status post a recent left hip fracture and ORIF, she was discharged yesterday to a subacute rehabilitation after a prolonged stay and [facility]. There, patient started getting short of breath. She is supposed to be on 2 L nasal cannula, but was requiring higher settings, so was placed on 4 L then 6 L of nasal cannula in the nursing home and eventually was sent back to the ER for further evaluation.  Here, patient continued having respiratory distress, so she was originally placed on a nonrebreather, and then she was De-escalated to 24 L nasal cannula after her heart rate has been controlled with the a 15 mg IV push of Cardizem given in the ER. Originally, her heart rate was in the 130s on presentation, and after the one bolus dose of Cardizem her heart rate dropped to the 80s and 90s but was still in atrial fibrillation. Patient's hemoglobin is 7.2 in the ER, and this is down from a hemoglobin of 11.5 on 4/13/2019, a hemoglobin of 9.2 on 4/14/2019 but no blood count has been done after that date. Patient had a positive fecal occult blood test and dark blood in her stool during this ER visit. Patient is on Eliquis for her atrial fibrillation and that will be stopped, aspirin will be held, gastroenterology will be consulted and a repeat H and H will be ordered. 2 units of PRBC will be given as patient's acute anemia is a direct cause of her shortness of breath and possibly her worsening tachycardia. I spoke to patient's son, Dary Friedman, who is present at bedside, and he is very upset and dissatisfied with patient's recent care, and upset that she was discharged yesterday and was readmitted today. He provided his phone number, [phone]- [phone] and would like to be updated and contacted about any new changes in patient's condition)     Course: Patient was admitted, blood transfusions given, and GI were consulted for patient's bleeding. Eliquis was stopped, and aspirin was temporarily held until hemoglobins become stable. Repeat hemoglobin were done, and levels seemed to stabilize. GI underwent an EGD and colonoscopy on the patient but no intervention was done, no active bleeding was noted, please refer back to GI. Patient was continued on Protonix. Patient has been stable for the past 2 days, with no dizziness, no weakness, no lightheadedness or chest pain. Labs are stable as well.  Patient has a chronic respiratory failure and is on nasal cannula aspirin was resumed yesterday, and hemoglobin on repeat today is stable still. Patient will be discharged to subacute rehabilitation. Since readmission has done well. Breathing at baseline. Denies CP, SOB. No wheezing. No bleeding, melena or hematochezia. No palpitations or tachycardia    UPDATE TODAY (10 MAY 2019): breathing as above. No CP. No palpitations. No bleeding, melena or hematochezia. PRIOR VISIT (18 APR 2018)  Patient admitted to [facility] from 4/13 to 4/17 for AMS, falls and confusion. D/c summary:  [facility] Course 78-year-old female brought to the emergency room to be evaluated for confusion. Patient is unable to provide history. History is obtained from the ED provider in the ED records. Son stated that patient was confused, fell, was combative. She also sustained laceration in the lower extremities and right side of the face. Son reported that patient was talking to people who were not present in the room. Workup in the emergency room showed acute on chronic renal failure, CT of the brain was negative for CVA. Bladder scan showed urinary retention, Brooks catheter was placed. Patient is admitted for further evaluation. Following admission, patient was in atrial fibrillation RVR requiring Cardizem drip, which was titrated off. Patient is currently rate controlled, much improved in her mental status, not in acute distress. At this time patient is deemed not safe to return home following physical therapy evaluation and occupational therapy evaluation, and also patient has had multiple admissions from home. At this time patient is agreeable to placement, and precertification has been started for nursing home placement. Patient will be discharged once approved. Also, not noted in d/c summary but revealed in labs, appears may have been treated for c. Diff. + screen on admission there.  She was ordered f/u stools at d/c by hospital. However, no diarrhea, stools are formed since admission    Since admission last evening had been doing ok until this morning developing inc SOB and hypoxia despite high flow O2 and breathing treatments. otherwise VSS and she feels ok. Unable to get SpO2 above 75%. D/w son and pt, she is already DNR/CC. They would like hospice consult and decline return to the hospital.     No confusion today, son feel's she is in her right mind. No falls since admission. Worked with PT this AM when got SOB and hypoxic    Voiding ok on her own. Brooks out. On cipro for UTI, sxs improved. No diarrhea, stools formed. UPDATE LAST VISIT (25 APR 2019): confusion con't to be resolved. No SOB. No hypoxia. Initially pt and son wanted hospice when she was hypoxia, but then they changed their minds, thus she was readmitted to [facility] as above. Voiding on her own yet, on cipro for UTI, sxs improved. No diarrhea, stools are formed yet. Working with PT to get strength back. UPDATE TODAY (10 MAY 2019): confusion resolved this AM. Waxes and wanes per staff. Voiding on her own yet. Off cipro. Bowels moving normally, no diarrhea    Working with PT/OT. HTN PRIOR VISIT: on cartia xt, lopressor. BP's since admission have been <140/90    UPDATE TODAY: on diltiazem TID and lopressor. BP's since readmission have been <140/90. Hypertrig: on tricor, had been tolerating    Arthritis/chronic pain/neuropathy: has pain related to RA, on butrans patch and prn tramadol as OP. Also on cymbalta, was on elavil, but this was stopped during admission. Not on DMARD pain controlled with above medications. However, roxanol just started as above, so will d/c tramadol    Depression/anxiety: on cymbalta. Was on elavil, but was stopped in the hospital. Anxiety up and down as above. Had buspar added to cymbalta in hospice. Has prn vistaril which helps some times. Did not do well with bzd prior, per son. No SI/HI. GERD: on omeprzole. Works well.  No side effecits    Hypothyroidism: was on synthroid, stopped in hospital, no clear why. RLS: was on requip, stopped in hospital, not clear why    PAD: on asa. No leg pain or claudiation. Was on eliquis, but stopped d/t bleeding as above. Descending aortic aneurysm: no chest pain. No intervention planned per family and pt      Allergies and Medications were reviewed through the [facility] EMR. All medications reviewed and reconciled, including OTC and herbal medications. Past Medical History:   Diagnosis Date    Anxiety     Atrial fibrillation ([address]     Chronic pain syndrome     Chronic venous insufficiency 3/16/2017    COPD (chronic obstructive pulmonary disease) ([facility]     Descending thoracic aortic aneurysm ([facility]     Female bladder prolapse     Former smoker     GERD (gastroesophageal reflux disease)     Heart failure with preserved ejection fraction ([facility])     History of left hip fracture     Hypertension     Hypertriglyceridemia     Hypothyroidism     Major depression     Moderate to severe mitral regurgitation     Neuropathy     Osteoporosis     PAD (peripheral artery disease) ([facility] 3/16/2017    Rheumatoid arthritis ([facility])     RLS (restless legs syndrome)        Past Surgical History:   Procedure Laterality Date    APPENDECTOMY      BACK SURGERY      spinal fusion    CHOLECYSTECTOMY      HYSTERECTOMY      ROTATOR CUFF REPAIR Left     TOTAL HIP ARTHROPLASTY Left     re: hip fxr       No Known Allergies    Social History     Tobacco Use    Smoking status: Former Smoker     Packs/day: 0.50     Types: Cigarettes    Smokeless tobacco: Never Used   Substance Use Topics    Alcohol use: No        No family history on file. I have reviewed the patient's past medical history, past surgical history, allergies, medications, social and family history and I have made updates where appropriate.       Review of Systems  Positive responses are highlighted in bold    Constitutional:  Fever, Chills, rate, irregular rhythm, normal S1 and S2, no murmurs appreciated by this observier, rubs, clicks, or gallops, distal pulses intact  Abdomen: soft, non-tender, non-distended, bowel sounds physiologic,  no rebound or guarding, no masses or hernias noted. Liver and spleen without enlargement. Extremities: no cyanosis, clubbing or edema of the lower extremities  Musculoskeletal: No joint swelling or gross deformity   Neuro:  Alert, 2+ patellar reflexes b/l,  normal speech, no focal findings or movement disorder noted  Psych:  Normal affect without evidence of depression or anxiety this AM, insight and judgement are appropriate, memory appears intact this AM  Skin: warm and dry, no rash or erythema, scattered bruising in various stages of healing.   Lymph:  No cervical, auricular or supraclavicular lymph nodes palpated      LABS/IMAGING    CBC 08 MAY 2019  CBC Without Differential      WBC                              5.8                        4.4-10.5 th/cmm      RBC                              3.51                 L     4.00-5.10 mil/cmm      Hemoglobin                       11.1                 L     12.0-15.0 gm/dL      Hematocrit                       33.6                 L     35.0-44.0 %      MCV                              95.8                       80-97 CU JOHN      MCH                              31.6                       27.5-33.0 PG      MCHC                             33.0                       33.0-36.0 gm/dL      RDW                              20.5                 H     12.0-16.0 %      Platelet Count                   342                        150-400 th/cmm      08 MAY 4933  Basic Metabolic,Non-Fasting      Sodium                           137                        135-145 mEq/L      Potassium                        4.4                        3.6-5.0 mEq/L      Chloride                          86                  L     101-111 mEq/L      Carbon Dioxide                    44                  H in pleural effusions and pulmonary   edema. ** Electronically Signed by Philip Poe MD **   ** on 05/06/2019 at 12 **   Reported and signed by: Philip Poe MD     Electronically Signed:   Philip Poe,   2019/05/06 at 1:39 EDT   Tel [phone], Service support  [phone], Fax [phone]      ECHO 06 MAY 2019   Summary     Normal LV size and function   Ejection fraction is visually estimated at 60%. Normal left ventricular wall thickness   Normal right ventricular systolic function   Right ventricular systolic pressure of 47 mm Hg consistent with mild   pulmonary hypertension. No evidence of atrial septal defect. Moderately dilated left atrium. Moderately dilated right atrium   The aortic valve is trileaflet with good leaflet separation. No evidence of aortic stenosis. Trivial aortic valve regurgitation   Moderate mitral regurgitation is present. Tricuspid valve is structurally normal.   Mild-to-moderate tricuspid regurgitation. No evidence of any pericardial effusion. Limited echo to assess function      ECHO 18 MARCH 2019   Summary     Moderate-severe mitral regurgitation is present. Normal LV size and function   Ejection fraction is visually estimated at 55%. Left ventricular diastolic relaxation abnormality is noted. Right ventricular systolic pressure of 43 mm Hg consistent with mild   pulmonary hypertension. Mild-moderately dilated left atrium. No evidence of atrial septal defect. Mild aortic valve regurgitation   Mild mitral annular calcification is present. Tricuspid valve is structurally normal.   Mild-moderate tricuspid regurgitation. Trace pulmonic regurgitation present. No evidence of any pericardial effusion. Dilated IVC with poor inspiration collapse consistent with elevated right   atrial pressure.       Narrative   PROCEDURE: CTA CHEST W WO CONTRAST       CLINICAL INFORMATION: Shortness of breath, recent hip surgery.       COMPARISON: Chest     No acute pulmonary embolism. 4.5 x 4.6 cm distal descending thoracic aortic aneurysm with short segment Matt B dissection versus mural thrombus. Moderate bilateral pleural effusions. Bilateral upper and lower lobe dependent atelectasis. Moderate centrilobular emphysema. No acute pneumonia. Additional findings as detailed above.           **This report has been created using voice recognition software. It may contain minor errors which are inherent in voice recognition technology. **       Final report electronically signed by Dr. Kirsten John on 2/11/2019 5:50 PM       ASSESSMENT & PLAN  1. Acute on chronic respiratory failure with hypoxia (HCC)    Improving slowly  Likely end stage  Hospice candidate, family working on financial barriers  Son prefers to keep pt comfortable and out of hospice. Palliative care following. They recommended roxanol as above. Plan:  O2  Scheduled and prn xopenex  pulmicort neb  After discussion with son regarding risk/benefits or roxanol, have started low dose prn roxanol at 2.5mg q4h prn dyspnea, pain or agitation   Long-term goal hospice. Short term goal comfort. Avoid hospitalization  DNR/CCA, son not ready to change to CC yet. con't palliative care f/u on this front  Spent 30 min on phone with son last nigh reviewing above. 2. Chronic obstructive pulmonary disease, unspecified COPD type ([address]    As per # 1    3. Dyspnea, unspecified type    As per # 1    4. Agitation    As per # 1  buspar on board, con't this, but will take time. con't cymbalta  Con't prn vistaril, avoid bzd per son preferance. Pt didn't do well prior. Roxanol started as per # 1. Risks and benefits discussed at great length with son  Consider IM haldol if unable to take PO. Again, discussed at length with son    5. Heart failure with preserved ejection fraction (HCC)    Stable  con't BB  Lasix  Daily wts    6.  Acute on chronic diastolic congestive heart failure ([address]    As per # 5    7. Atrial fibrillation, unspecified type ([facility]    con't dilt, lopressor and asa  No OAC d/t bleeding    8. Moderate to severe mitral regurgitation    Noted on echo  Plan as above    9. Acute blood loss anemia    No s/s bleeding  Monitor  Check labs tomorrow to trend  GI f/u 2 wks, Dr. Ruff Ends    10. Metabolic encephalopathy    Appears resolved  Monitor     11. Fall in home, initial encounter    PT/OT    12. STEPHANY (acute kidney injury) ([facility]    Improved  F/u labs ordered    13. Urinary retention    Resolved, off ATB, west out. Monitor     14. Acute urinary tract infection    As per # 13    15. Clostridium difficile diarrhea    No diarrhea, resolved  Monitor     16. Physical deconditioning    PT/OT    17. Essential hypertension    At goal  con't dilt and lopressor    18. Hypertriglyceridemia    con't tricor    19. Rheumatoid arthritis involving multiple sites, unspecified rheumatoid factor presence ([facility]    Not on DMARD, defer to PCP  con't butrens patch  Stop prn tramadol as per # 1. Has roxanol prn  con't cymbalta  con't off elavil, as stopped during readmission to [facility]    20. Chronic pain syndrome    As per # 19    21. Neuropathy    As per # 19    22. Recurrent major depressive disorder, in full remission ([facility]    As above with agitation  Con't cymbalta, buspar, prn vistarl, roxanol prn    Antipsychotic/Antianxiety/Hypnotic/Psychotropic/Sedation/Antidepressant medications are continued at this time because discontinuation may result in adverse effects or return of concerning behaviors/symptoms. 23. Anxiety      24. Gastroesophageal reflux disease, esophagitis presence not specified     con't PPI  Doing well    25. Hypothyroidism, unspecified type    Synthroid stopped in hospital, not clear why  Check tsh Monday, if ok or high, resume synthroid    26. RLS (restless legs syndrome)    Off requip, stopped in hospital  Will con't off for now    27.  PAD (peripheral artery disease) ([facility]    con't secondary prevention with ASA  Not on statin, defer to PCP at d/c    28. Descending aortic aneurysm ([facility]    Will monitor for s/s   Per old notes, family declined intervention  con't BB and BP control  F/u PCP OP      DNR/CCA. Con't PT/OT. Reassess 30 days, sooner prn.        Electronically signed by Danuta Arcos DO on 5/10/2019 at 11:14 AM

## 2019-05-10 ENCOUNTER — OUTSIDE SERVICES (OUTPATIENT)
Dept: FAMILY MEDICINE CLINIC | Age: 79
End: 2019-05-10
Payer: MEDICARE

## 2019-05-10 VITALS
DIASTOLIC BLOOD PRESSURE: 65 MMHG | TEMPERATURE: 98 F | HEART RATE: 77 BPM | WEIGHT: 106.4 LBS | RESPIRATION RATE: 20 BRPM | SYSTOLIC BLOOD PRESSURE: 126 MMHG | BODY MASS INDEX: 20.09 KG/M2 | HEIGHT: 61 IN

## 2019-05-10 DIAGNOSIS — G25.81 RLS (RESTLESS LEGS SYNDROME): ICD-10-CM

## 2019-05-10 DIAGNOSIS — N39.0 ACUTE URINARY TRACT INFECTION: ICD-10-CM

## 2019-05-10 DIAGNOSIS — R33.9 URINARY RETENTION: ICD-10-CM

## 2019-05-10 DIAGNOSIS — I71.9 DESCENDING AORTIC ANEURYSM (HCC): ICD-10-CM

## 2019-05-10 DIAGNOSIS — G93.41 METABOLIC ENCEPHALOPATHY: ICD-10-CM

## 2019-05-10 DIAGNOSIS — M06.9 RHEUMATOID ARTHRITIS INVOLVING MULTIPLE SITES, UNSPECIFIED RHEUMATOID FACTOR PRESENCE: ICD-10-CM

## 2019-05-10 DIAGNOSIS — R06.00 DYSPNEA, UNSPECIFIED TYPE: ICD-10-CM

## 2019-05-10 DIAGNOSIS — R45.1 AGITATION: ICD-10-CM

## 2019-05-10 DIAGNOSIS — E78.1 HYPERTRIGLYCERIDEMIA: ICD-10-CM

## 2019-05-10 DIAGNOSIS — R53.81 PHYSICAL DECONDITIONING: ICD-10-CM

## 2019-05-10 DIAGNOSIS — G89.4 CHRONIC PAIN SYNDROME: ICD-10-CM

## 2019-05-10 DIAGNOSIS — I50.33 ACUTE ON CHRONIC DIASTOLIC CONGESTIVE HEART FAILURE (HCC): ICD-10-CM

## 2019-05-10 DIAGNOSIS — I50.30 HEART FAILURE WITH PRESERVED EJECTION FRACTION (HCC): ICD-10-CM

## 2019-05-10 DIAGNOSIS — D62 ACUTE BLOOD LOSS ANEMIA: ICD-10-CM

## 2019-05-10 DIAGNOSIS — I10 ESSENTIAL HYPERTENSION: ICD-10-CM

## 2019-05-10 DIAGNOSIS — I48.91 ATRIAL FIBRILLATION, UNSPECIFIED TYPE (HCC): ICD-10-CM

## 2019-05-10 DIAGNOSIS — G62.9 NEUROPATHY: ICD-10-CM

## 2019-05-10 DIAGNOSIS — F33.42 RECURRENT MAJOR DEPRESSIVE DISORDER, IN FULL REMISSION (HCC): ICD-10-CM

## 2019-05-10 DIAGNOSIS — J44.9 CHRONIC OBSTRUCTIVE PULMONARY DISEASE, UNSPECIFIED COPD TYPE (HCC): ICD-10-CM
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
## 2019-05-10 DIAGNOSIS — I73.9 PAD (PERIPHERAL ARTERY DISEASE) (HCC): ICD-10-CM

## 2019-05-10 DIAGNOSIS — A04.72 CLOSTRIDIUM DIFFICILE DIARRHEA: ICD-10-CM

## 2019-05-10 DIAGNOSIS — I34.0 MODERATE TO SEVERE MITRAL REGURGITATION: ICD-10-CM

## 2019-05-10 DIAGNOSIS — J96.21 ACUTE ON CHRONIC RESPIRATORY FAILURE WITH HYPOXIA (HCC): Primary | ICD-10-CM

## 2019-05-10 DIAGNOSIS — E03.9 HYPOTHYROIDISM, UNSPECIFIED TYPE: ICD-10-CM

## 2019-05-10 DIAGNOSIS — K21.9 GASTROESOPHAGEAL REFLUX DISEASE, ESOPHAGITIS PRESENCE NOT SPECIFIED: ICD-10-CM

## 2019-05-10 DIAGNOSIS — N17.9 AKI (ACUTE KIDNEY INJURY) (HCC): ICD-10-CM
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
## 2019-05-10 DIAGNOSIS — F41.9 ANXIETY: ICD-10-CM

## 2019-05-10 PROCEDURE — 99310 SBSQ NF CARE HIGH MDM 45: CPT | Performed by: FAMILY MEDICINE

## 2019-05-10 PROCEDURE — 1123F ACP DISCUSS/DSCN MKR DOCD: CPT | Performed by: FAMILY MEDICINE

## 2019-05-13 VITALS
WEIGHT: 107 LBS | OXYGEN SATURATION: 96 % | TEMPERATURE: 98 F | BODY MASS INDEX: 20.2 KG/M2 | RESPIRATION RATE: 18 BRPM | HEART RATE: 104 BPM | DIASTOLIC BLOOD PRESSURE: 68 MMHG | SYSTOLIC BLOOD PRESSURE: 115 MMHG | HEIGHT: 61 IN

## 2019-05-13 RX ORDER — HYDROXYZINE PAMOATE 50 MG/1
50 CAPSULE ORAL EVERY 6 HOURS PRN
COMMUNITY

## 2019-05-13 RX ORDER — MORPHINE SULFATE 100 MG/5ML
2.5 SOLUTION ORAL EVERY 4 HOURS PRN
COMMUNITY
Start: 2019-05-09

## 2019-05-13 NOTE — PROGRESS NOTES
NAME: Alex Carter  DATE: 19  ROOM #: 37-1  CODE STATUS: DNR/CCA  REASON FOR VISIT:Anxiety  : 1940  ADMISSION DATE: 2019  READMISSION DATE: 2019  SKILLED PATIENT: Yes    Chief Complaint   Patient presents with    Anxiety       History obtained from the patient. SUBJECTIVE:  Alex Carter is a 78 y.o. female that presents today for significant anxiety. This is not new for patient. Unable to offer much insight. Throughout course of conversation, she states that she does not wish to go back to the hospital at any point and would rather go home. Stating that she does have pain, but cannot give this a number. Reports a poor appetite and SOB, which is not new. Offers little more information pertinent to visit. Current Outpatient Medications   Medication Sig Dispense Refill    hydrOXYzine (VISTARIL) 50 MG capsule Take 50 mg by mouth every 6 hours as needed for Anxiety      morphine sulfate 20 MG/ML concentrated oral solution Take 2.5 mg by mouth every 4 hours as needed for Pain (SOB).       metoprolol tartrate (LOPRESSOR) 50 MG tablet Take 1 tablet by mouth 2 times daily 60 tablet 0    diltiazem (CARDIZEM CD) 120 MG extended release capsule Take 1 capsule by mouth daily 30 capsule 0    apixaban (ELIQUIS) 5 MG TABS tablet Take 1 tablet by mouth 2 times daily 60 tablet     levothyroxine (SYNTHROID) 112 MCG tablet Take 1 tablet by mouth Daily 30 tablet 3    Calcium Carbonate 500 MG CHEW Take 500 mg by mouth daily       DULoxetine (CYMBALTA) 30 MG extended release capsule Take 30 mg by mouth daily      conjugated estrogens (PREMARIN) 0.625 MG/GM vaginal cream Insert 0.3125 mg vaginally once daily on Monday and Friday      polyethylene glycol (GLYCOLAX) powder Take 17 g by mouth daily      docusate sodium (COLACE) 100 MG capsule Take 100 mg by mouth 2 times daily      Nutritional Supplements (BOOST PO) Take by mouth 3 times daily      triamcinolone (KENALOG) 0.1 % cream Apply topically 3 times daily Apply topically 2 times daily.  denosumab (PROLIA) 60 MG/ML SOLN SC injection Inject 60 mg into the skin once      omeprazole (PRILOSEC) 20 MG capsule Take 20 mg by mouth 2 times daily.  fenofibrate (TRICOR) 145 MG tablet Take 145 mg by mouth daily.  rOPINIRole (REQUIP) 0.25 MG tablet Take 0.25 mg by mouth nightly       clonazePAM (KLONOPIN) 2 MG tablet Take 2 mg by mouth nightly as needed.  amitriptyline (ELAVIL) 25 MG tablet Take 25 mg by mouth nightly.  albuterol (PROVENTIL HFA;VENTOLIN HFA) 108 (90 BASE) MCG/ACT inhaler Inhale 2 puffs into the lungs every 6 hours as needed. No current facility-administered medications for this visit. No orders of the defined types were placed in this encounter. All medications reviewed and reconciled, including OTC and herbal medications. Updated list given to patient.      Patient Active Problem List    Diagnosis Date Noted    Essential hypertension 02/15/2019    Acquired hypothyroidism 02/15/2019    Atrial fibrillation with RVR ([address] 02/11/2019    PAD (peripheral artery disease) ([address] 03/16/2017    Chronic venous insufficiency 03/16/2017       Past Medical History:   Diagnosis Date    Anxiety     Atrial fibrillation ([address]     Chronic pain syndrome     Chronic venous insufficiency 3/16/2017    COPD (chronic obstructive pulmonary disease) ([address]     Descending thoracic aortic aneurysm ([address]     Female bladder prolapse     Former smoker     GERD (gastroesophageal reflux disease)     Heart failure with preserved ejection fraction ([address]     History of left hip fracture     Hypertension     Hypertriglyceridemia     Hypothyroidism     Major depression     Moderate to severe mitral regurgitation     Neuropathy     Osteoporosis     PAD (peripheral artery disease) ([address] 3/16/2017    Rheumatoid arthritis (HCC)     RLS (restless legs syndrome)        Past Surgical History:   Procedure Laterality Date    APPENDECTOMY      BACK SURGERY      spinal fusion    CHOLECYSTECTOMY      HYSTERECTOMY      ROTATOR CUFF REPAIR Left     TOTAL HIP ARTHROPLASTY Left     re: hip fxr       No Known Allergies    Social History     Tobacco Use    Smoking status: Former Smoker     Packs/day: 0.50     Types: Cigarettes    Smokeless tobacco: Never Used   Substance Use Topics    Alcohol use: No     No family history on file. I have reviewed the patient's past medical history, past surgical history, allergies, medications, social and family history and I have made updates where appropriate. Review of Systems  Positive responses are highlighted in bold    Constitutional:  Fever, Chills, Night Sweats, Fatigue, Unexpected changes in weight  HENT:  Ear pain, Tinnitus, Nosebleeds, Trouble swallowing, Hearing loss, Sore throat  Cardiovascular:  Chest Pain, Palpitations, Orthopnea, Paroxysmal Nocturnal Dyspnea  Respiratory:  Cough, Wheezing, Shortness of breath, Chest tightness, Apnea  Gastrointestinal:  Nausea, Vomiting, Diarrhea, Constipation, Heartburn, Blood in stool  Genitourinary:  Difficulty or painful urination, Flank pain, Change in frequency, Urgency  Skin:  Color change, Rash, Itching, Wound  Musculoskeletal:  Joint pain, Back pain, Gait problems, Joint swelling, Myalgias  Neurological:  Dizziness, Headaches, Presyncope, Numbness, Seizures, Tremors  Endocrine:  Heat Intolerance, Cold Intolerance, Polydipsia, Polyphagia, Polyuria      PHYSICAL EXAM:  Vitals:    05/13/19 1028   BP: 115/68   Pulse: 104   Resp: 18   Temp: 98 °F (36.7 °C)   SpO2: 96%   Weight: 107 lb (48.5 kg)   Height: 5' 1\" (1.549 m)     Body mass index is 20.22 kg/m². VS Reviewed  General Appearance: Thin, elderly female in mild distress.   Eyes: pupils equal, round, and reactive to light, extraocular eye movements intact, conjunctivae and eye lids without erythema  ENT: external ear and ear canal clear bilaterally, TMs intact and regular, nose without deformity, nasal mucosa and turbinates normal without polyps, oropharynx normal, dentition is normal for age  Neck: supple and non-tender without mass, no thyromegaly or thyroid nodules, no cervical lymphadenopathy  Pulmonary/Chest: clear to auscultation bilaterally- no wheezes, rales or rhonchi, normal air movement, no respiratory distress or retractions  Cardiovascular: S1 and S2 auscultated w/ RRR. No murmurs, rubs, clicks, or gallops, distal pulses intact. Abdomen: soft, non-tender, non-distended, bowel sounds physiologic,  no rebound or guarding, no masses or hernias noted. Liver and spleen without enlargement. Extremities: no cyanosis, clubbing or edema of the lower extremities. Skin: warm and dry, no rash or erythema. Scattered bruises on all extremities. Areas of scabbed over lesions on bilat lower legs. Skin color change on lower 1/3 of bilat legs. ASSESSMENT & PLAN  1. Anxiety: Was started on Buspar in hospital, which has not become effective yet. Is also on Cymbalta. Increase Vistaril to 50 mg po every 6 hours PRN. Called Deven Perez CNP at [facility]. She states that [address]  had spoken with patient and family and they are to follow up with patient next week. This provider called [facility] and spoke with nurse Mina Valentin, who states that they will be out next Monday or Tuesday. Disposition: Patient is DNR-CCA, is frail, and in generally poor health and it would benefit her to be on hospice care. We will continue to work with her, son, and outside sources to get her on the appropriate care.      Plan of care reviewed with Dr Zen Brooks    Electronically signed by CYNDI Begum CNP on 5/13/2019 at 10:37 AM

## 2019-05-28 RX ORDER — FUROSEMIDE 20 MG/1
TABLET ORAL
Qty: 180 TABLET | Refills: 0 | OUTPATIENT
Start: 2019-05-28

## 2019-09-06 ENCOUNTER — HOSPITAL ENCOUNTER (EMERGENCY)
Age: 79
Discharge: ANOTHER HOSPITAL | End: 2019-09-06
Payer: MEDICARE

## 2019-09-06 VITALS
SYSTOLIC BLOOD PRESSURE: 78 MMHG | HEIGHT: 61 IN | OXYGEN SATURATION: 98 % | TEMPERATURE: 96.7 F | WEIGHT: 107 LBS | HEART RATE: 90 BPM | BODY MASS INDEX: 20.2 KG/M2 | DIASTOLIC BLOOD PRESSURE: 53 MMHG | RESPIRATION RATE: 16 BRPM

## 2019-09-06 DIAGNOSIS — I95.9 HYPOTENSION, UNSPECIFIED HYPOTENSION TYPE: Primary | ICD-10-CM
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
## 2019-09-06 DIAGNOSIS — S81.812A LACERATION OF LEFT LOWER LEG, INITIAL ENCOUNTER: ICD-10-CM

## 2019-09-06 DIAGNOSIS — S00.83XA CONTUSION OF FACE, INITIAL ENCOUNTER: ICD-10-CM

## 2019-09-06 PROCEDURE — 99215 OFFICE O/P EST HI 40 MIN: CPT

## 2019-09-06 PROCEDURE — 99212 OFFICE O/P EST SF 10 MIN: CPT | Performed by: NURSE PRACTITIONER

## 2019-09-06 ASSESSMENT — PAIN DESCRIPTION - PAIN TYPE: TYPE: ACUTE PAIN

## 2019-09-06 ASSESSMENT — PAIN DESCRIPTION - ORIENTATION: ORIENTATION: RIGHT

## 2019-09-06 ASSESSMENT — PAIN DESCRIPTION - DESCRIPTORS: DESCRIPTORS: DISCOMFORT;ACHING

## 2019-09-06 ASSESSMENT — PAIN - FUNCTIONAL ASSESSMENT: PAIN_FUNCTIONAL_ASSESSMENT: ACTIVITIES ARE NOT PREVENTED

## 2019-09-06 ASSESSMENT — PAIN SCALES - WONG BAKER: WONGBAKER_NUMERICALRESPONSE: 8

## 2019-09-06 ASSESSMENT — PAIN DESCRIPTION - LOCATION: LOCATION: EYE

## 2019-09-09 ASSESSMENT — ENCOUNTER SYMPTOMS
COLOR CHANGE: 1
APNEA: 0
NAUSEA: 0
COUGH: 0
CHOKING: 0
WHEEZING: 0
VISUAL CHANGE: 0
CHEST TIGHTNESS: 0
STRIDOR: 0
ABDOMINAL PAIN: 0
BOWEL INCONTINENCE: 0
VOMITING: 0
SHORTNESS OF BREATH: 0